# Patient Record
Sex: FEMALE | Race: WHITE | NOT HISPANIC OR LATINO | Employment: FULL TIME | URBAN - METROPOLITAN AREA
[De-identification: names, ages, dates, MRNs, and addresses within clinical notes are randomized per-mention and may not be internally consistent; named-entity substitution may affect disease eponyms.]

---

## 2017-01-05 ENCOUNTER — ROUTINE PRENATAL (OUTPATIENT)
Dept: OBSTETRICS AND GYNECOLOGY | Facility: CLINIC | Age: 27
End: 2017-01-05
Payer: COMMERCIAL

## 2017-01-05 VITALS
WEIGHT: 130.31 LBS | BODY MASS INDEX: 22.36 KG/M2 | SYSTOLIC BLOOD PRESSURE: 122 MMHG | DIASTOLIC BLOOD PRESSURE: 60 MMHG

## 2017-01-05 DIAGNOSIS — Z3A.11 11 WEEKS GESTATION OF PREGNANCY: Primary | ICD-10-CM

## 2017-01-05 LAB
BILIRUB SERPL-MCNC: NORMAL MG/DL
BLOOD URINE, POC: NORMAL
COLOR, POC UA: YELLOW
GLUCOSE UR QL STRIP: NORMAL
KETONES UR QL STRIP: NORMAL
LEUKOCYTE ESTERASE URINE, POC: NORMAL
NITRITE, POC UA: NORMAL
PH, POC UA: 5
PROTEIN, POC: NORMAL
SPECIFIC GRAVITY, POC UA: NORMAL
UROBILINOGEN, POC UA: NORMAL

## 2017-01-05 PROCEDURE — 99999 PR PBB SHADOW E&M-EST. PATIENT-LVL II: CPT | Mod: PBBFAC,,, | Performed by: SPECIALIST

## 2017-01-05 PROCEDURE — 0502F SUBSEQUENT PRENATAL CARE: CPT | Mod: S$GLB,,, | Performed by: SPECIALIST

## 2017-01-05 NOTE — PROGRESS NOTES
Pt returns for continued prenatal care  Discussed anatomy U/S at 18 weeks  Discussed quickening as well  I reviewed pt's past medical history, past and current meds, family history, allergies and reviewed problem list  RTO 4 weeks

## 2017-01-26 ENCOUNTER — TELEPHONE (OUTPATIENT)
Dept: OBSTETRICS AND GYNECOLOGY | Facility: CLINIC | Age: 27
End: 2017-01-26

## 2017-01-26 NOTE — TELEPHONE ENCOUNTER
----- Message from Elaine Antony sent at 1/26/2017 10:07 AM CST -----  Contact: self [phone]  She would like to know if she can just change her OB doctor back to Dr Krause (she delivered her last child).  Please call her to let her know.  Thank you!

## 2017-02-07 ENCOUNTER — ROUTINE PRENATAL (OUTPATIENT)
Dept: OBSTETRICS AND GYNECOLOGY | Facility: CLINIC | Age: 27
End: 2017-02-07
Payer: COMMERCIAL

## 2017-02-07 VITALS
WEIGHT: 126.75 LBS | DIASTOLIC BLOOD PRESSURE: 62 MMHG | BODY MASS INDEX: 21.76 KG/M2 | SYSTOLIC BLOOD PRESSURE: 112 MMHG

## 2017-02-07 DIAGNOSIS — Z3A.16 16 WEEKS GESTATION OF PREGNANCY: ICD-10-CM

## 2017-02-07 DIAGNOSIS — Z34.92 NORMAL PREGNANCY, SECOND TRIMESTER: Primary | ICD-10-CM

## 2017-02-07 LAB
BILIRUB SERPL-MCNC: NORMAL MG/DL
BLOOD URINE, POC: NORMAL
COLOR, POC UA: NORMAL
GLUCOSE UR QL STRIP: NORMAL
KETONES UR QL STRIP: NORMAL
LEUKOCYTE ESTERASE URINE, POC: NORMAL
NITRITE, POC UA: NORMAL
PH, POC UA: 5
PROTEIN, POC: NORMAL
SPECIFIC GRAVITY, POC UA: NORMAL
UROBILINOGEN, POC UA: NORMAL

## 2017-02-07 PROCEDURE — 99999 PR PBB SHADOW E&M-EST. PATIENT-LVL II: CPT | Mod: PBBFAC,,, | Performed by: OBSTETRICS & GYNECOLOGY

## 2017-02-07 PROCEDURE — 0502F SUBSEQUENT PRENATAL CARE: CPT | Mod: S$GLB,,, | Performed by: OBSTETRICS & GYNECOLOGY

## 2017-02-07 NOTE — PROGRESS NOTES
Maggy Koenig is a 26 y.o. female with Estimated Date of Delivery: 17   Obstetric History       T2      TAB0   SAB0   E0   M0   L2         AT 16w4d  Here today on 2017 for   Chief Complaint   Patient presents with    Routine Prenatal Visit     no problems        Current Outpatient Prescriptions   Medication Sig Dispense Refill    PNV #22-iron [id number] 28 mg iron-6 mg iron-1 mg Cmpk Take by mouth.      promethazine (PHENERGAN) 25 MG tablet Take 1 tablet (25 mg total) by mouth every 4 (four) hours. 30 tablet 1     No current facility-administered medications for this visit.      Review of patient's allergies indicates:  No Known Allergies    Past Medical History   Diagnosis Date    Abnormal Pap smear of vagina      colpo    Mild preeclampsia      At 37 weeks last pregnancy       Past Surgical History   Procedure Laterality Date    Colposcopy       section         OB History    Para Term  AB SAB TAB Ectopic Multiple Living   3 2 2       2      # Outcome Date GA Lbr Krishna/2nd Weight Sex Delivery Anes PTL Lv   3 Current            2 Term 13   2.977 kg (6 lb 9 oz) F CS-LTranv Spinal N Y      Birth Comments: System Generated. Please review and update pregnancy details.   1 Term 2011 37w0d  2.58 kg (5 lb 11 oz) F CS-LTranv Spinal  Y      Birth Comments: pre-eclampsia and delivered at 37 weeks via           Social History     Social History    Marital status:      Spouse name: N/A    Number of children: N/A    Years of education: N/A     Social History Main Topics    Smoking status: Never Smoker    Smokeless tobacco: Never Used    Alcohol use No    Drug use: No    Sexual activity: Yes     Partners: Male     Birth control/ protection: None     Other Topics Concern    Not on file     Social History Narrative       Vitals:    17 1524   BP: 112/62       Review of Symptoms:  GENERAL: Denies fatigue, and malaise.   SKIN:  Denies rash or lesions.   HEAD: Denies head injury or headache.   NODES: Denies enlarged lymph nodes.   CHEST: Denies chest pain or shortness of breath.   CARDIOVASCULAR: Denies palpitations or left sided chest pain.   ABDOMEN: No abdominal pain, constipation, diarrhea, nausea, vomiting or rectal bleeding.   URINARY: No frequency, urgency, dysuria, or hematuria  MUSCULOSKELETAL: Denies joint pain or swelling.   NEUROLOGIC: Denies seizures.    A POS    ASSESSMENT    Encounter Diagnoses   Name Primary?    16 weeks gestation of pregnancy Yes    Normal pregnancy, second trimester        PLAN  1.  RTC 4 weeks    I have reviewed the blood pressure, urine results, fetal heart rate check, fundal height and agree.

## 2017-02-07 NOTE — MR AVS SNAPSHOT
[facility] - OB/GYN  [address] [place] [address]  Phone: [phone]                  Maggy ADAN Liberty   2017 3:20 PM   Routine Prenatal    Description:  Female : 1990   Provider:  Clementine Krause MD   Department:  [facility] - OB/GYN           Reason for Visit     Routine Prenatal Visit           Diagnoses this Visit        Comments    16 weeks gestation of pregnancy    -  Primary            To Do List           Goals (5 Years of Data)     None      [facility]     [facility] -  Assistance  Registered nurses in the [facility] On Call Center provide clinical advisement, health education, appointment booking, and other advisory services.  Call for this free service at [phone].             Medications           Message regarding Medications     Verify the changes and/or additions to your medication regime listed below are the same as discussed with your clinician today.  If any of these changes or additions are incorrect, please notify your healthcare provider.             Verify that the below list of medications is an accurate representation of the medications you are currently taking.  If none reported, the list may be blank. If incorrect, please contact your healthcare provider. Carry this list with you in case of emergency.           Current Medications     PNV #22-iron [id number] 28 mg iron-6 mg iron-1 mg Cmpk Take by mouth.    promethazine (PHENERGAN) 25 MG tablet Take 1 tablet (25 mg total) by mouth every 4 (four) hours.           Clinical Reference Information           Prenatal Vitals     Enc. Date GA Prenatal Vitals Prenatal Pulse Pain Level Urine Albumin/Glucose Edema Presentation Dilation/Effacement/Station    17 16w4d 112/62 / 57.5 kg (126 lb 12.2 oz)  / 147  0 Negative / Negative       17 11w6d 122/60 / 59.1 kg (130 lb 4.7 oz)  / 154  0 Negative / Negative       16 7w6d 124/70 / 59.8 kg (131 lb 13.4 oz)    0 Negative / Negative         Your Vitals Were     BP Weight Last Period BMI       112/62 57.5 kg (126 lb 12.2 oz) 10/09/2016 21.76 kg/m2       Allergies as of 2/7/2017     No Known Allergies      Immunizations Administered on Date of Encounter - 2/7/2017     None      Orders Placed During Today's Visit      Normal Orders This Visit    POCT URINE DIPSTICK WITHOUT MICROSCOPE          2/7/2017  3:26 PM - Rosa Elena Valladares LPN      Component Results     Component    Color, UA    Spec Grav, UA    pH, UA    5    WBC, UA    neg    Nitrite, UA    neg    Protein, UA    neg    Glucose, UA    neg    Ketones, UA    neg    Urobilinogen, UA    neg    Bilirubin, UA    neg    Blood, UA    neg            Language Assistance Services     ATTENTION: Language assistance services are available, free of charge. Please call [phone].      ATENCIÓN: Si habla bryan, tiene a weiss disposición servicios gratuitos de asistencia lingüística. Llame al [phone].     CHÚ Ý: N?u b?n nói Ti?ng Vi?t, có các d?ch v? h? tr? ngôn ng? mi?n phí dành cho b?n. G?i s? [phone].         [facility] - OB/GYN complies with applicable Federal civil rights laws and does not discriminate on the basis of race, color, national origin, age, disability, or sex.

## 2017-03-09 ENCOUNTER — HOSPITAL ENCOUNTER (OUTPATIENT)
Dept: RADIOLOGY | Facility: CLINIC | Age: 27
Discharge: HOME | End: 2017-03-09
Attending: OBSTETRICS & GYNECOLOGY
Payer: COMMERCIAL

## 2017-03-09 ENCOUNTER — ROUTINE PRENATAL (OUTPATIENT)
Dept: OBSTETRICS AND GYNECOLOGY | Facility: CLINIC | Age: 27
End: 2017-03-09
Payer: COMMERCIAL

## 2017-03-09 VITALS
WEIGHT: 131.19 LBS | BODY MASS INDEX: 22.52 KG/M2 | DIASTOLIC BLOOD PRESSURE: 60 MMHG | SYSTOLIC BLOOD PRESSURE: 116 MMHG

## 2017-03-09 DIAGNOSIS — Z34.92 NORMAL PREGNANCY, SECOND TRIMESTER: ICD-10-CM

## 2017-03-09 DIAGNOSIS — Z3A.20 20 WEEKS GESTATION OF PREGNANCY: Primary | ICD-10-CM

## 2017-03-09 LAB
BILIRUB SERPL-MCNC: NEGATIVE MG/DL
BLOOD URINE, POC: NEGATIVE
COLOR, POC UA: YELLOW
GLUCOSE UR QL STRIP: NEGATIVE
KETONES UR QL STRIP: NEGATIVE
LEUKOCYTE ESTERASE URINE, POC: NEGATIVE
NITRITE, POC UA: NEGATIVE
PH, POC UA: 8
PROTEIN, POC: NEGATIVE
SPECIFIC GRAVITY, POC UA: NORMAL
UROBILINOGEN, POC UA: NEGATIVE

## 2017-03-09 PROCEDURE — 81002 URINALYSIS NONAUTO W/O SCOPE: CPT | Mod: S$GLB,,, | Performed by: OBSTETRICS & GYNECOLOGY

## 2017-03-09 PROCEDURE — 0502F SUBSEQUENT PRENATAL CARE: CPT | Mod: S$GLB,,, | Performed by: OBSTETRICS & GYNECOLOGY

## 2017-03-09 PROCEDURE — 76805 OB US >/= 14 WKS SNGL FETUS: CPT | Mod: 26,,, | Performed by: RADIOLOGY

## 2017-03-09 PROCEDURE — 99999 PR PBB SHADOW E&M-EST. PATIENT-LVL II: CPT | Mod: PBBFAC,,, | Performed by: OBSTETRICS & GYNECOLOGY

## 2017-03-09 PROCEDURE — 76805 OB US >/= 14 WKS SNGL FETUS: CPT | Mod: TC,PO

## 2017-03-09 NOTE — MR AVS SNAPSHOT
[facility] - OB/GYN  [address] [place] [address]  Phone: [phone]                  Maggy ADAN Liberty   3/9/2017 3:20 PM   Routine Prenatal    Description:  Female : 1990   Provider:  Clementine Krause MD   Department:  [facility] - OB/GYN           Reason for Visit     Routine Prenatal Visit           Diagnoses this Visit        Comments    20 weeks gestation of pregnancy    -  Primary            To Do List           Future Appointments        Provider Department Dept Phone    3/9/2017  4:00 PM [facility] US1 [facility] - Ultrasound [phone]      Goals (5 Years of Data)     None      [facility]     [facility] -  Assistance  Registered nurses in the [facility] Center provide clinical advisement, health education, appointment booking, and other advisory services.  Call for this free service at [phone].             Medications           Message regarding Medications     Verify the changes and/or additions to your medication regime listed below are the same as discussed with your clinician today.  If any of these changes or additions are incorrect, please notify your healthcare provider.             Verify that the below list of medications is an accurate representation of the medications you are currently taking.  If none reported, the list may be blank. If incorrect, please contact your healthcare provider. Carry this list with you in case of emergency.           Current Medications     PNV #22-iron [id number] 28 mg iron-6 mg iron-1 mg Cmpk Take 1 tablet by mouth once daily.     promethazine (PHENERGAN) 25 MG tablet Take 1 tablet (25 mg total) by mouth every 4 (four) hours.           Clinical Reference Information           Prenatal Vitals     Enc. Date GA Prenatal Vitals Prenatal Pulse Pain Level Urine Albumin/Glucose Edema Presentation Dilation/Effacement/Station    3/9/17 20w6d 116/60 / 59.5 kg (131 lb 2.8 oz) 21 cm / 159  0  Negative / Negative       2/7/17 16w4d 112/62 / 57.5 kg (126 lb 12.2 oz)  / 147  0 Negative / Negative       1/5/17 11w6d 122/60 / 59.1 kg (130 lb 4.7 oz)  / 154  0 Negative / Negative       12/8/16 7w6d 124/70 / 59.8 kg (131 lb 13.4 oz)   0 Negative / Negative         Your Vitals Were     BP Weight Last Period BMI       116/60 59.5 kg (131 lb 2.8 oz) 10/09/2016 22.52 kg/m2       Allergies as of 3/9/2017     No Known Allergies      Immunizations Administered on Date of Encounter - 3/9/2017     None      Orders Placed During Today's Visit      Normal Orders This Visit    POCT URINE DIPSTICK WITHOUT MICROSCOPE          3/9/2017  3:29 PM - Shavonne La LPN      Component Results     Component    Color    Yellow    Spec Grav    pH, UA    8    WBC, UA    Negative    Nitrite    Negative    Protein    Negative    Glucose, UA    Negative    Ketones, UA    Negative    Urobilinogen    Negative    Bilirubin    Negative    Blood, UA    Negative            Language Assistance Services     ATTENTION: Language assistance services are available, free of charge. Please call [phone].      ATENCIÓN: Si habla español, tiene a weiss disposición servicios gratuitos de asistencia lingüística. Llame al [phone].     NAKIA Ý: N?u b?n nói Ti?ng Vi?t, có các d?ch v? h? tr? ngôn ng? mi?n phí dành cho b?n. G?i s? [phone].         [facility] - OB/GYN complies with applicable Federal civil rights laws and does not discriminate on the basis of race, color, national origin, age, disability, or sex.

## 2017-03-09 NOTE — PROGRESS NOTES
Maggy Koenig is a 26 y.o. female with Estimated Date of Delivery: 17   Obstetric History       T2      TAB0   SAB0   E0   M0   L2         AT 20w6d  Here today on 3/9/2017 for   Chief Complaint   Patient presents with    Routine Prenatal Visit     20 weeks gestation       Current Outpatient Prescriptions   Medication Sig Dispense Refill    PNV #22-iron [id number] 28 mg iron-6 mg iron-1 mg Cmpk Take 1 tablet by mouth once daily.       promethazine (PHENERGAN) 25 MG tablet Take 1 tablet (25 mg total) by mouth every 4 (four) hours. 30 tablet 1     No current facility-administered medications for this visit.      Review of patient's allergies indicates:  No Known Allergies    Past Medical History:   Diagnosis Date    Abnormal Pap smear of vagina     colpo    Mild preeclampsia     At 37 weeks last pregnancy       Past Surgical History:   Procedure Laterality Date     SECTION      COLPOSCOPY         OB History    Para Term  AB SAB TAB Ectopic Multiple Living   3 2 2       2      # Outcome Date GA Lbr Krishna/2nd Weight Sex Delivery Anes PTL Lv   3 Current            2 Term 13   2.977 kg (6 lb 9 oz) F CS-LTranv Spinal N Y      Birth Comments: System Generated. Please review and update pregnancy details.   1 Term 2011 37w0d  2.58 kg (5 lb 11 oz) F CS-LTranv Spinal  Y      Birth Comments: pre-eclampsia and delivered at 37 weeks via           Social History     Social History    Marital status:      Spouse name: N/A    Number of children: N/A    Years of education: N/A     Social History Main Topics    Smoking status: Never Smoker    Smokeless tobacco: Never Used    Alcohol use No    Drug use: No    Sexual activity: Yes     Partners: Male     Birth control/ protection: None     Other Topics Concern    None     Social History Narrative       Vitals:    17 1530   BP: 116/60       Review of Symptoms:  GENERAL: Denies fatigue, and  malaise.   SKIN: Denies rash or lesions.   HEAD: Denies head injury or headache.   NODES: Denies enlarged lymph nodes.   CHEST: Denies chest pain or shortness of breath.   CARDIOVASCULAR: Denies palpitations or left sided chest pain.   ABDOMEN: No abdominal pain, constipation, diarrhea, nausea, vomiting or rectal bleeding.   URINARY: No frequency, urgency, dysuria, or hematuria  MUSCULOSKELETAL: Denies joint pain or swelling.   NEUROLOGIC: Denies seizures.    A POS    ASSESSMENT    Encounter Diagnoses   Name Primary?    20 weeks gestation of pregnancy Yes    Normal pregnancy, second trimester        PLAN  1.  RTC 4 with ob glucose  I have reviewed the blood pressure, urine results, fetal heart rate check, fundal height and agree.

## 2017-04-01 ENCOUNTER — LAB VISIT (OUTPATIENT)
Dept: LAB | Facility: HOSPITAL | Age: 27
End: 2017-04-01
Attending: OBSTETRICS & GYNECOLOGY
Payer: COMMERCIAL

## 2017-04-01 DIAGNOSIS — Z3A.20 20 WEEKS GESTATION OF PREGNANCY: ICD-10-CM

## 2017-04-01 LAB — GLUCOSE SERPL-MCNC: 118 MG/DL

## 2017-04-01 PROCEDURE — 36415 COLL VENOUS BLD VENIPUNCTURE: CPT | Mod: PO

## 2017-04-01 PROCEDURE — 82950 GLUCOSE TEST: CPT

## 2017-04-18 ENCOUNTER — ROUTINE PRENATAL (OUTPATIENT)
Dept: OBSTETRICS AND GYNECOLOGY | Facility: CLINIC | Age: 27
End: 2017-04-18
Payer: COMMERCIAL

## 2017-04-18 VITALS
WEIGHT: 134.69 LBS | SYSTOLIC BLOOD PRESSURE: 116 MMHG | DIASTOLIC BLOOD PRESSURE: 70 MMHG | BODY MASS INDEX: 23.12 KG/M2

## 2017-04-18 DIAGNOSIS — Z34.92 NORMAL PREGNANCY, SECOND TRIMESTER: ICD-10-CM

## 2017-04-18 DIAGNOSIS — Z3A.26 26 WEEKS GESTATION OF PREGNANCY: Primary | ICD-10-CM

## 2017-04-18 LAB
BILIRUB SERPL-MCNC: NEGATIVE MG/DL
BLOOD URINE, POC: NEGATIVE
COLOR, POC UA: NORMAL
GLUCOSE UR QL STRIP: NEGATIVE
KETONES UR QL STRIP: NEGATIVE
LEUKOCYTE ESTERASE URINE, POC: NEGATIVE
NITRITE, POC UA: NEGATIVE
PH, POC UA: 8
PROTEIN, POC: NEGATIVE
SPECIFIC GRAVITY, POC UA: NORMAL
UROBILINOGEN, POC UA: NEGATIVE

## 2017-04-18 PROCEDURE — 99999 PR PBB SHADOW E&M-EST. PATIENT-LVL II: CPT | Mod: PBBFAC,,, | Performed by: OBSTETRICS & GYNECOLOGY

## 2017-04-18 PROCEDURE — 0502F SUBSEQUENT PRENATAL CARE: CPT | Mod: S$GLB,,, | Performed by: OBSTETRICS & GYNECOLOGY

## 2017-04-18 NOTE — MR AVS SNAPSHOT
[facility] - OB/GYN  [address] [place] [address]  Phone: [phone]                  aMggy ADAN Liberty   2017 9:40 AM   Routine Prenatal    Description:  Female : 1990   Provider:  Clementine Krause MD   Department:  [facility] - OB/GYN           Reason for Visit     Routine Prenatal Visit                To Do List           Goals (5 Years of Data)     None      [facility]     [facility] -  Assistance  Unless otherwise directed by your provider, please contact [facility] On-Call, our nurse care line that is available for  assistance.     Registered nurses in the [facility] On Call Center provide: appointment scheduling, clinical advisement, health education, and other advisory services.  Call: [phone] (toll free)               Medications           Message regarding Medications     Verify the changes and/or additions to your medication regime listed below are the same as discussed with your clinician today.  If any of these changes or additions are incorrect, please notify your healthcare provider.             Verify that the below list of medications is an accurate representation of the medications you are currently taking.  If none reported, the list may be blank. If incorrect, please contact your healthcare provider. Carry this list with you in case of emergency.           Current Medications     PNV #22-iron [id number]-dha 28 mg iron-6 mg iron-1 mg Cmpk Take 1 tablet by mouth once daily.     promethazine (PHENERGAN) 25 MG tablet Take 1 tablet (25 mg total) by mouth every 4 (four) hours.           Clinical Reference Information           Prenatal Vitals     Enc. Date GA Prenatal Vitals Prenatal Pulse Pain Level Urine Albumin/Glucose Edema Presentation Dilation/Effacement/Station    17 26w4d 116/70 / 61.1 kg (134 lb 11.2 oz) 26 cm / 135 / Present  0 Negative / Negative       3/9/17 20w6d 116/60 / 59.5 kg (131 lb 2.8 oz) 21 cm  / 159  0 Negative / Negative       2/7/17 16w4d 112/62 / 57.5 kg (126 lb 12.2 oz)  / 147  0 Negative / Negative       1/5/17 11w6d 122/60 / 59.1 kg (130 lb 4.7 oz)  / 154  0 Negative / Negative       12/8/16 7w6d 124/70 / 59.8 kg (131 lb 13.4 oz)   0 Negative / Negative         Your Vitals Were     BP Weight Last Period BMI       116/70 61.1 kg (134 lb 11.2 oz) 10/09/2016 23.12 kg/m2       Allergies as of 4/18/2017     No Known Allergies      Immunizations Administered on Date of Encounter - 4/18/2017     None      Language Assistance Services     ATTENTION: Language assistance services are available, free of charge. Please call [phone].      ATENCIÓN: Si habla bryan, tiene a weiss disposición servicios gratuitos de asistencia lingüística. Llame al [phone].     CHÚ Ý: N?u b?n nói Ti?ng Vi?t, có các d?ch v? h? tr? ngôn ng? mi?n phí dành cho b?n. G?i s? [phone].         [facility] - OB/GYN complies with applicable Federal civil rights laws and does not discriminate on the basis of race, color, national origin, age, disability, or sex.

## 2017-04-18 NOTE — PROGRESS NOTES
Maggy Koenig is a 26 y.o. female with Estimated Date of Delivery: 17   Obstetric History       T2      TAB0   SAB0   E0   M0   L2         AT 26w4d  Here today on 2017 for   Chief Complaint   Patient presents with    Routine Prenatal Visit       Current Outpatient Prescriptions   Medication Sig Dispense Refill    PNV #22-iron [id number]-[id number] 28 mg iron-6 mg iron-1 mg Cmpk Take 1 tablet by mouth once daily.       promethazine (PHENERGAN) 25 MG tablet Take 1 tablet (25 mg total) by mouth every 4 (four) hours. 30 tablet 1     No current facility-administered medications for this visit.      Review of patient's allergies indicates:  No Known Allergies    Past Medical History:   Diagnosis Date    Abnormal Pap smear of vagina     colpo    Mild preeclampsia     At 37 weeks last pregnancy       Past Surgical History:   Procedure Laterality Date     SECTION      COLPOSCOPY         OB History    Para Term  AB SAB TAB Ectopic Multiple Living   3 2 2       2      # Outcome Date GA Lbr Krishna/2nd Weight Sex Delivery Anes PTL Lv   3 Current            2 Term 13   2.977 kg (6 lb 9 oz) F CS-LTranv Spinal N Y      Birth Comments: System Generated. Please review and update pregnancy details.   1 Term 11 37w0d  2.58 kg (5 lb 11 oz) F CS-LTranv Spinal  Y      Birth Comments: pre-eclampsia and delivered at 37 weeks via           Social History     Social History    Marital status:      Spouse name: N/A    Number of children: N/A    Years of education: N/A     Social History Main Topics    Smoking status: Never Smoker    Smokeless tobacco: Never Used    Alcohol use No    Drug use: No    Sexual activity: Yes     Partners: Male     Birth control/ protection: None     Other Topics Concern    Not on file     Social History Narrative       Vitals:    17 1003   BP: 116/70       Review of Symptoms:  GENERAL: Denies fatigue, and malaise.   SKIN:  Denies rash or lesions.   HEAD: Denies head injury or headache.   NODES: Denies enlarged lymph nodes.   CHEST: Denies chest pain or shortness of breath.   CARDIOVASCULAR: Denies palpitations or left sided chest pain.   ABDOMEN: No abdominal pain, constipation, diarrhea, nausea, vomiting or rectal bleeding.   URINARY: No frequency, urgency, dysuria, or hematuria  MUSCULOSKELETAL: Denies joint pain or swelling.   NEUROLOGIC: Denies seizures.    A POS    ASSESSMENT    Encounter Diagnoses   Name Primary?    26 weeks gestation of pregnancy Yes    Normal pregnancy, second trimester        PLAN  1.  RTC 3 weeks     I have reviewed the blood pressure, urine results, fetal heart rate check, fundal height and agree.

## 2017-05-09 ENCOUNTER — ROUTINE PRENATAL (OUTPATIENT)
Dept: OBSTETRICS AND GYNECOLOGY | Facility: CLINIC | Age: 27
End: 2017-05-09
Payer: COMMERCIAL

## 2017-05-09 VITALS — DIASTOLIC BLOOD PRESSURE: 70 MMHG | SYSTOLIC BLOOD PRESSURE: 120 MMHG | WEIGHT: 138.69 LBS | BODY MASS INDEX: 23.8 KG/M2

## 2017-05-09 DIAGNOSIS — Z34.93 NORMAL PREGNANCY, THIRD TRIMESTER: ICD-10-CM

## 2017-05-09 DIAGNOSIS — Z3A.29 29 WEEKS GESTATION OF PREGNANCY: Primary | ICD-10-CM

## 2017-05-09 LAB
BILIRUB SERPL-MCNC: NORMAL MG/DL
BLOOD URINE, POC: NORMAL
COLOR, POC UA: NORMAL
GLUCOSE UR QL STRIP: NORMAL
KETONES UR QL STRIP: NORMAL
LEUKOCYTE ESTERASE URINE, POC: NORMAL
NITRITE, POC UA: NORMAL
PH, POC UA: NORMAL
PROTEIN, POC: NORMAL
SPECIFIC GRAVITY, POC UA: NORMAL
UROBILINOGEN, POC UA: NORMAL

## 2017-05-09 PROCEDURE — 81002 URINALYSIS NONAUTO W/O SCOPE: CPT | Mod: S$GLB,,, | Performed by: OBSTETRICS & GYNECOLOGY

## 2017-05-09 PROCEDURE — 99999 PR PBB SHADOW E&M-EST. PATIENT-LVL II: CPT | Mod: PBBFAC,,, | Performed by: OBSTETRICS & GYNECOLOGY

## 2017-05-09 PROCEDURE — 0502F SUBSEQUENT PRENATAL CARE: CPT | Mod: S$GLB,,, | Performed by: OBSTETRICS & GYNECOLOGY

## 2017-05-09 NOTE — PROGRESS NOTES
Maggy Koenig is a 26 y.o. female with Estimated Date of Delivery: 17   Obstetric History       T2      TAB0   SAB0   E0   M0   L2         AT 29w4d  Here today on 2017 for   Chief Complaint   Patient presents with    Routine Prenatal Visit     No problems        Current Outpatient Prescriptions   Medication Sig Dispense Refill    PNV #22-iron [id number]-dha 28 mg iron-6 mg iron-1 mg Cmpk Take 1 tablet by mouth once daily.       promethazine (PHENERGAN) 25 MG tablet Take 1 tablet (25 mg total) by mouth every 4 (four) hours. 30 tablet 1     No current facility-administered medications for this visit.      Review of patient's allergies indicates:  No Known Allergies    Past Medical History:   Diagnosis Date    Abnormal Pap smear of vagina     colpo    Mild preeclampsia     At 37 weeks last pregnancy       Past Surgical History:   Procedure Laterality Date     SECTION      COLPOSCOPY         OB History    Para Term  AB SAB TAB Ectopic Multiple Living   3 2 2       2      # Outcome Date GA Lbr Krishna/2nd Weight Sex Delivery Anes PTL Lv   3 Current            2 Term 13 39w0d  2.977 kg (6 lb 9 oz) F CS-LTranv Spinal N Y      Birth Comments: System Generated. Please review and update pregnancy details.   1 Term 11 37w0d  2.58 kg (5 lb 11 oz) F CS-LTranv Spinal  Y      Birth Comments: pre-eclampsia and delivered at 37 weeks via           Social History     Social History    Marital status:      Spouse name: N/A    Number of children: N/A    Years of education: N/A     Social History Main Topics    Smoking status: Never Smoker    Smokeless tobacco: Never Used    Alcohol use No    Drug use: No    Sexual activity: Yes     Partners: Male     Birth control/ protection: None     Other Topics Concern    None     Social History Narrative       Vitals:    17 1544   BP: 120/70       Review of Symptoms:  GENERAL: Denies fatigue, and  malaise.   SKIN: Denies rash or lesions.   HEAD: Denies head injury or headache.   NODES: Denies enlarged lymph nodes.   CHEST: Denies chest pain or shortness of breath.   CARDIOVASCULAR: Denies palpitations or left sided chest pain.   ABDOMEN: No abdominal pain, constipation, diarrhea, nausea, vomiting or rectal bleeding.   URINARY: No frequency, urgency, dysuria, or hematuria  MUSCULOSKELETAL: Denies joint pain or swelling.   NEUROLOGIC: Denies seizures.    A POS    ASSESSMENT    Encounter Diagnoses   Name Primary?    29 weeks gestation of pregnancy Yes    Normal pregnancy, third trimester        PLAN  1.  RTC 3 weeks     I have reviewed the blood pressure, urine results, fetal heart rate check, fundal height and agree.

## 2017-05-09 NOTE — MR AVS SNAPSHOT
[facility]  [address], [address]  [facility] [address]  Phone: [phone]  Fax: [phone]                  Maggy Banuelosmickey   2017 4:00 PM   Routine Prenatal    Description:  Female : 1990   Provider:  Clementine Krause MD   Department:  [facility]           Reason for Visit     Routine Prenatal Visit                To Do List           Future Appointments        Provider Department Dept Phone    2017 4:00 PM Clementine Krause MD [facility] [phone]      Goals (5 Years of Data)     None      [facility]     [facility] Care Line -  Assistance  Unless otherwise directed by your provider, please contact [facility], our nurse care line that is available for  assistance.     Registered nurses in the [facility] provide: appointment scheduling, clinical advisement, health education, and other advisory services.  Call: [phone] (toll free)               Medications           Message regarding Medications     Verify the changes and/or additions to your medication regime listed below are the same as discussed with your clinician today.  If any of these changes or additions are incorrect, please notify your healthcare provider.             Verify that the below list of medications is an accurate representation of the medications you are currently taking.  If none reported, the list may be blank. If incorrect, please contact your healthcare provider. Carry this list with you in case of emergency.           Current Medications     PNV #22-iron [id number]-[id number]-dha 28 mg iron-6 mg iron-1 mg Cmpk Take 1 tablet by mouth once daily.     promethazine (PHENERGAN) 25 MG tablet Take 1 tablet (25 mg total) by mouth every 4 (four) hours.           Clinical Reference Information           Prenatal Vitals     Enc. Date GA Prenatal Vitals Prenatal Pulse Pain Level Urine Albumin/Glucose  Edema Presentation Dilation/Effacement/Station    5/9/17 29w4d 120/70 / 62.9 kg (138 lb 10.7 oz) 30 cm / 147 / Present  0        4/18/17 26w4d 116/70 / 61.1 kg (134 lb 11.2 oz) 26 cm / 135 / Present  0 Negative / Negative       3/9/17 20w6d 116/60 / 59.5 kg (131 lb 2.8 oz) 21 cm / 159  0 Negative / Negative       2/7/17 16w4d 112/62 / 57.5 kg (126 lb 12.2 oz)  / 147  0 Negative / Negative       1/5/17 11w6d 122/60 / 59.1 kg (130 lb 4.7 oz)  / 154  0 Negative / Negative       12/8/16 7w6d 124/70 / 59.8 kg (131 lb 13.4 oz)   0 Negative / Negative         Your Vitals Were     BP Weight Last Period BMI       120/70 62.9 kg (138 lb 10.7 oz) 10/09/2016 23.8 kg/m2       Allergies as of 5/9/2017     No Known Allergies      Immunizations Administered on Date of Encounter - 5/9/2017     None      Language Assistance Services     ATTENTION: Language assistance services are available, free of charge. Please call [phone].      ATENCIÓN: Si habla bryan, tiene a weiss disposición servicios gratuitos de asistencia lingüística. Llame al [phone].     CHÚ Ý: N?u b?n nói Ti?ng Vi?t, có các d?ch v? h? tr? ngôn ng? mi?n phí dành cho b?n. G?i s? [phone].         [facility] complies with applicable Federal civil rights laws and does not discriminate on the basis of race, color, national origin, age, disability, or sex.

## 2017-05-30 ENCOUNTER — PATIENT MESSAGE (OUTPATIENT)
Dept: OBSTETRICS AND GYNECOLOGY | Facility: CLINIC | Age: 27
End: 2017-05-30

## 2017-06-05 ENCOUNTER — ROUTINE PRENATAL (OUTPATIENT)
Dept: OBSTETRICS AND GYNECOLOGY | Facility: CLINIC | Age: 27
End: 2017-06-05
Payer: COMMERCIAL

## 2017-06-05 VITALS
WEIGHT: 143.94 LBS | BODY MASS INDEX: 24.71 KG/M2 | DIASTOLIC BLOOD PRESSURE: 80 MMHG | SYSTOLIC BLOOD PRESSURE: 116 MMHG

## 2017-06-05 DIAGNOSIS — Z3A.34 34 WEEKS GESTATION OF PREGNANCY: Primary | ICD-10-CM

## 2017-06-05 DIAGNOSIS — Z34.93 NORMAL PREGNANCY, THIRD TRIMESTER: ICD-10-CM

## 2017-06-05 PROCEDURE — 0502F SUBSEQUENT PRENATAL CARE: CPT | Mod: S$GLB,,, | Performed by: OBSTETRICS & GYNECOLOGY

## 2017-06-05 PROCEDURE — 99999 PR PBB SHADOW E&M-EST. PATIENT-LVL III: CPT | Mod: PBBFAC,,, | Performed by: OBSTETRICS & GYNECOLOGY

## 2017-06-05 NOTE — PROGRESS NOTES
Maggy Koenig is a 26 y.o. female with Estimated Date of Delivery: 17   Obstetric History       T2      L2     SAB0   TAB0   Ectopic0   Multiple0   Live Births2         AT 34w1d  Here today on 2017 for   Chief Complaint   Patient presents with    Routine Prenatal Visit       Current Outpatient Prescriptions   Medication Sig Dispense Refill    PNV #22-iron [id number] 28 mg iron-6 mg iron-1 mg Cmpk Take 1 tablet by mouth once daily.       promethazine (PHENERGAN) 25 MG tablet Take 1 tablet (25 mg total) by mouth every 4 (four) hours. 30 tablet 1     No current facility-administered medications for this visit.      Review of patient's allergies indicates:  No Known Allergies    Past Medical History:   Diagnosis Date    Abnormal Pap smear of vagina     colpo    Mild preeclampsia     At 37 weeks last pregnancy       Past Surgical History:   Procedure Laterality Date     SECTION      COLPOSCOPY         OB History    Para Term  AB Living   3 2 2   2   SAB TAB Ectopic Multiple Live Births       2      # Outcome Date GA Lbr Krishna/2nd Weight Sex Delivery Anes PTL Lv   3 Current            2 Term 13 39w0d  2.977 kg (6 lb 9 oz) F CS-LTranv Spinal N KADI      Birth Comments: System Generated. Please review and update pregnancy details.   1 Term 11 37w0d  2.58 kg (5 lb 11 oz) F CS-LTranv Spinal  KADI      Birth Comments: pre-eclampsia and delivered at 37 weeks via           Social History     Social History    Marital status:      Spouse name: N/A    Number of children: N/A    Years of education: N/A     Social History Main Topics    Smoking status: Never Smoker    Smokeless tobacco: Never Used    Alcohol use No    Drug use: No    Sexual activity: Yes     Partners: Male     Birth control/ protection: None     Other Topics Concern    Not on file     Social History Narrative    No narrative on file       Vitals:    17 1055    BP: 116/80       Review of Symptoms:  GENERAL: Denies fatigue, and malaise.   SKIN: Denies rash or lesions.   HEAD: Denies head injury or headache.   NODES: Denies enlarged lymph nodes.   CHEST: Denies chest pain or shortness of breath.   CARDIOVASCULAR: Denies palpitations or left sided chest pain.   ABDOMEN: No abdominal pain, constipation, diarrhea, nausea, vomiting or rectal bleeding.   URINARY: No frequency, urgency, dysuria, or hematuria  MUSCULOSKELETAL: Denies joint pain or swelling.   NEUROLOGIC: Denies seizures.    A POS    ASSESSMENT    No diagnosis found.    PLAN  1.  RTC 2 weeks with ultrasound    I have reviewed the blood pressure, urine results, fetal heart rate check, fundal height and agree.

## 2017-06-15 ENCOUNTER — PATIENT MESSAGE (OUTPATIENT)
Dept: OBSTETRICS AND GYNECOLOGY | Facility: CLINIC | Age: 27
End: 2017-06-15

## 2017-06-19 ENCOUNTER — ROUTINE PRENATAL (OUTPATIENT)
Dept: OBSTETRICS AND GYNECOLOGY | Facility: CLINIC | Age: 27
End: 2017-06-19
Payer: COMMERCIAL

## 2017-06-19 ENCOUNTER — HOSPITAL ENCOUNTER (OUTPATIENT)
Dept: RADIOLOGY | Facility: HOSPITAL | Age: 27
Discharge: HOME | End: 2017-06-19
Attending: OBSTETRICS & GYNECOLOGY
Payer: COMMERCIAL

## 2017-06-19 VITALS — BODY MASS INDEX: 25.32 KG/M2 | SYSTOLIC BLOOD PRESSURE: 122 MMHG | DIASTOLIC BLOOD PRESSURE: 80 MMHG | WEIGHT: 147.5 LBS

## 2017-06-19 DIAGNOSIS — Z3A.36 36 WEEKS GESTATION OF PREGNANCY: Primary | ICD-10-CM

## 2017-06-19 DIAGNOSIS — Z3A.34 34 WEEKS GESTATION OF PREGNANCY: ICD-10-CM

## 2017-06-19 LAB
BILIRUB SERPL-MCNC: NORMAL MG/DL
BLOOD URINE, POC: NORMAL
COLOR, POC UA: YELLOW
GLUCOSE UR QL STRIP: NORMAL
KETONES UR QL STRIP: NORMAL
LEUKOCYTE ESTERASE URINE, POC: NORMAL
NITRITE, POC UA: NORMAL
PH, POC UA: 6
PROTEIN, POC: NORMAL
SPECIFIC GRAVITY, POC UA: NORMAL
UROBILINOGEN, POC UA: NORMAL

## 2017-06-19 PROCEDURE — 76816 OB US FOLLOW-UP PER FETUS: CPT | Mod: 26,,, | Performed by: RADIOLOGY

## 2017-06-19 PROCEDURE — 0502F SUBSEQUENT PRENATAL CARE: CPT | Mod: S$GLB,,, | Performed by: OBSTETRICS & GYNECOLOGY

## 2017-06-19 PROCEDURE — 87081 CULTURE SCREEN ONLY: CPT

## 2017-06-19 PROCEDURE — 76816 OB US FOLLOW-UP PER FETUS: CPT | Mod: TC

## 2017-06-19 PROCEDURE — 99999 PR PBB SHADOW E&M-EST. PATIENT-LVL III: CPT | Mod: PBBFAC,,, | Performed by: OBSTETRICS & GYNECOLOGY

## 2017-06-19 NOTE — PROGRESS NOTES
Maggy Koenig is a 26 y.o. female with Estimated Date of Delivery: 17   Obstetric History       T2      L2     SAB0   TAB0   Ectopic0   Multiple0   Live Births2         AT 36w1d  Here today on 2017 for   Chief Complaint   Patient presents with    Routine Prenatal Visit       Current Outpatient Prescriptions   Medication Sig Dispense Refill    PNV #22-iron [id number] 28 mg iron-6 mg iron-1 mg Cmpk Take 1 tablet by mouth once daily.       ranitidine (ZANTAC) 300 MG tablet Take 1 tablet (300 mg total) by mouth nightly. 30 tablet 1    promethazine (PHENERGAN) 25 MG tablet Take 1 tablet (25 mg total) by mouth every 4 (four) hours. 30 tablet 1     No current facility-administered medications for this visit.      Review of patient's allergies indicates:  No Known Allergies    Past Medical History:   Diagnosis Date    Abnormal Pap smear of vagina     colpo    Mild preeclampsia     At 37 weeks last pregnancy       Past Surgical History:   Procedure Laterality Date     SECTION      COLPOSCOPY         OB History    Para Term  AB Living   3 2 2   2   SAB TAB Ectopic Multiple Live Births       2      # Outcome Date GA Lbr Krishna/2nd Weight Sex Delivery Anes PTL Lv   3 Current            2 Term 13 39w0d  2.977 kg (6 lb 9 oz) F CS-LTranv Spinal N KADI      Birth Comments: System Generated. Please review and update pregnancy details.   1 Term 11 37w0d  2.58 kg (5 lb 11 oz) F CS-LTranv Spinal  KADI      Birth Comments: pre-eclampsia and delivered at 37 weeks via           Social History     Social History    Marital status:      Spouse name: N/A    Number of children: N/A    Years of education: N/A     Social History Main Topics    Smoking status: Never Smoker    Smokeless tobacco: Never Used    Alcohol use No    Drug use: No    Sexual activity: Yes     Partners: Male     Birth control/ protection: None     Other Topics Concern     None     Social History Narrative    None       Vitals:    06/19/17 1046   BP: 122/80       Review of Symptoms:  GENERAL: Denies fatigue, and malaise.   SKIN: Denies rash or lesions.   HEAD: Denies head injury or headache.   NODES: Denies enlarged lymph nodes.   CHEST: Denies chest pain or shortness of breath.   CARDIOVASCULAR: Denies palpitations or left sided chest pain.   ABDOMEN: No abdominal pain, constipation, diarrhea, nausea, vomiting or rectal bleeding.   URINARY: No frequency, urgency, dysuria, or hematuria  MUSCULOSKELETAL: Denies joint pain or swelling.   NEUROLOGIC: Denies seizures.    A POS    ASSESSMENT    Encounter Diagnoses   Name Primary?    36 weeks gestation of pregnancy Yes       PLAN  1.  RTC 1 weeks     I have reviewed the blood pressure, urine results, fetal heart rate check, fundal height and agree.

## 2017-06-21 LAB — BACTERIA SPEC AEROBE CULT: NORMAL

## 2017-06-26 ENCOUNTER — TELEPHONE (OUTPATIENT)
Dept: OBSTETRICS AND GYNECOLOGY | Facility: CLINIC | Age: 27
End: 2017-06-26

## 2017-06-26 ENCOUNTER — ROUTINE PRENATAL (OUTPATIENT)
Dept: OBSTETRICS AND GYNECOLOGY | Facility: CLINIC | Age: 27
End: 2017-06-26
Payer: COMMERCIAL

## 2017-06-26 VITALS
WEIGHT: 147.69 LBS | HEART RATE: 105 BPM | BODY MASS INDEX: 25.35 KG/M2 | SYSTOLIC BLOOD PRESSURE: 140 MMHG | DIASTOLIC BLOOD PRESSURE: 58 MMHG

## 2017-06-26 DIAGNOSIS — Z3A.37 37 WEEKS GESTATION OF PREGNANCY: Primary | ICD-10-CM

## 2017-06-26 DIAGNOSIS — R50.9 FEVER AND CHILLS: ICD-10-CM

## 2017-06-26 LAB
BILIRUB SERPL-MCNC: NEGATIVE MG/DL
BLOOD URINE, POC: NEGATIVE
COLOR, POC UA: NORMAL
GLUCOSE UR QL STRIP: NEGATIVE
KETONES UR QL STRIP: NEGATIVE
LEUKOCYTE ESTERASE URINE, POC: NEGATIVE
NITRITE, POC UA: NEGATIVE
PH, POC UA: 6
PROTEIN, POC: NEGATIVE
SPECIFIC GRAVITY, POC UA: NORMAL
UROBILINOGEN, POC UA: NEGATIVE

## 2017-06-26 PROCEDURE — 0502F SUBSEQUENT PRENATAL CARE: CPT | Mod: S$GLB,,, | Performed by: OBSTETRICS & GYNECOLOGY

## 2017-06-26 PROCEDURE — 99999 PR PBB SHADOW E&M-EST. PATIENT-LVL II: CPT | Mod: PBBFAC,,, | Performed by: OBSTETRICS & GYNECOLOGY

## 2017-06-26 RX ORDER — OSELTAMIVIR PHOSPHATE 75 MG/1
75 CAPSULE ORAL 2 TIMES DAILY
Qty: 10 CAPSULE | Refills: 0 | Status: SHIPPED | OUTPATIENT
Start: 2017-06-26 | End: 2017-07-01

## 2017-06-26 NOTE — PROGRESS NOTES
Maggy Koenig is a 26 y.o. female with Estimated Date of Delivery: 17   Obstetric History       T2      L2     SAB0   TAB0   Ectopic0   Multiple0   Live Births2         AT 37w1d  Here today on 2017 for   Chief Complaint   Patient presents with    Back Pain     flank pain more on the right,     Fever     low grade 100.6 since Saturday, tylenol brings it down, temp currently 97.2       Current Outpatient Prescriptions   Medication Sig Dispense Refill    oseltamivir (TAMIFLU) 75 MG capsule Take 1 capsule (75 mg total) by mouth 2 (two) times daily. 10 capsule 0    PNV #22-iron [id number]-dha 28 mg iron-6 mg iron-1 mg Cmpk Take 1 tablet by mouth once daily.       promethazine (PHENERGAN) 25 MG tablet Take 1 tablet (25 mg total) by mouth every 4 (four) hours. 30 tablet 1    ranitidine (ZANTAC) 300 MG tablet Take 1 tablet (300 mg total) by mouth nightly. 30 tablet 1     No current facility-administered medications for this visit.      Review of patient's allergies indicates:  No Known Allergies    Past Medical History:   Diagnosis Date    Abnormal Pap smear of vagina     colpo    Mild preeclampsia     At 37 weeks last pregnancy       Past Surgical History:   Procedure Laterality Date     SECTION      COLPOSCOPY         OB History    Para Term  AB Living   3 2 2     2   SAB TAB Ectopic Multiple Live Births           2      # Outcome Date GA Lbr Krishna/2nd Weight Sex Delivery Anes PTL Lv   3 Current            2 Term 13 39w0d  2.977 kg (6 lb 9 oz) F CS-LTranv Spinal N KADI      Birth Comments: System Generated. Please review and update pregnancy details.   1 Term 11 37w0d  2.58 kg (5 lb 11 oz) F CS-LTranv Spinal  KADI      Birth Comments: pre-eclampsia and delivered at 37 weeks via           Social History     Social History    Marital status:      Spouse name: N/A    Number of children: N/A    Years of education: N/A     Social  History Main Topics    Smoking status: Never Smoker    Smokeless tobacco: Never Used    Alcohol use No    Drug use: No    Sexual activity: Yes     Partners: Male     Birth control/ protection: None     Other Topics Concern    Not on file     Social History Narrative    No narrative on file       Vitals:    06/26/17 1048   BP: (!) 140/58   Pulse: 105       Review of Symptoms:  GENERAL: +++ fatigue, and malaise. Fever 100.8  SKIN: Denies rash or lesions.   HEAD: Denies head injury or headache.   NODES: Denies enlarged lymph nodes.   CHEST: Denies chest pain or shortness of breath.   CARDIOVASCULAR: Denies palpitations or left sided chest pain.   ABDOMEN: No abdominal pain, constipation, diarrhea, nausea, vomiting or rectal bleeding.   URINARY: No frequency, urgency, dysuria, or hematuria  MUSCULOSKELETAL: Denies joint pain or swelling.   NEUROLOGIC: Denies seizures.    A POS    ASSESSMENT    Encounter Diagnoses   Name Primary?    37 weeks gestation of pregnancy Yes    Fever and chills        PLAN  1.  RTC 1 weeks     I have reviewed the blood pressure, urine results, fetal heart rate check, fundal height and agree.

## 2017-06-26 NOTE — TELEPHONE ENCOUNTER
----- Message from Zoila Arellnao sent at 6/26/2017  7:12 AM CDT -----  Contact: self  Patient [phone] is calling/she is 37 weeks pregnant/since Saturday night 06 24 17 patient has had some back pain/fever up to 100.6 orally and chills/she has an appt tomorrow 06 27 17 at 2:40am but is asking to be seen today/she states she had no other symptoms/no congestion/no cough/no other pains/please call patient this morning

## 2017-07-03 ENCOUNTER — LAB VISIT (OUTPATIENT)
Dept: LAB | Facility: HOSPITAL | Age: 27
End: 2017-07-03
Attending: OBSTETRICS & GYNECOLOGY
Payer: COMMERCIAL

## 2017-07-03 ENCOUNTER — ROUTINE PRENATAL (OUTPATIENT)
Dept: OBSTETRICS AND GYNECOLOGY | Facility: CLINIC | Age: 27
End: 2017-07-03
Payer: COMMERCIAL

## 2017-07-03 VITALS
BODY MASS INDEX: 25.43 KG/M2 | DIASTOLIC BLOOD PRESSURE: 62 MMHG | WEIGHT: 148.13 LBS | SYSTOLIC BLOOD PRESSURE: 120 MMHG | HEART RATE: 110 BPM

## 2017-07-03 DIAGNOSIS — R00.0 TACHYCARDIA: ICD-10-CM

## 2017-07-03 DIAGNOSIS — Z34.93 NORMAL PREGNANCY, THIRD TRIMESTER: Primary | ICD-10-CM

## 2017-07-03 LAB
ALBUMIN SERPL BCP-MCNC: 2.3 G/DL
ALP SERPL-CCNC: 148 U/L
ALT SERPL W/O P-5'-P-CCNC: 7 U/L
ANION GAP SERPL CALC-SCNC: 12 MMOL/L
AST SERPL-CCNC: 13 U/L
BASOPHILS # BLD AUTO: 0.01 K/UL
BASOPHILS NFR BLD: 0.1 %
BILIRUB SERPL-MCNC: 0.2 MG/DL
BUN SERPL-MCNC: 8 MG/DL
CALCIUM SERPL-MCNC: 9.3 MG/DL
CHLORIDE SERPL-SCNC: 105 MMOL/L
CO2 SERPL-SCNC: 21 MMOL/L
CREAT SERPL-MCNC: 0.8 MG/DL
DIFFERENTIAL METHOD: ABNORMAL
EOSINOPHIL # BLD AUTO: 0 K/UL
EOSINOPHIL NFR BLD: 0 %
ERYTHROCYTE [DISTWIDTH] IN BLOOD BY AUTOMATED COUNT: 17.3 %
EST. GFR  (AFRICAN AMERICAN): >60 ML/MIN/1.73 M^2
EST. GFR  (NON AFRICAN AMERICAN): >60 ML/MIN/1.73 M^2
GLUCOSE SERPL-MCNC: 126 MG/DL
HCT VFR BLD AUTO: 31.6 %
HGB BLD-MCNC: 10.3 G/DL
LYMPHOCYTES # BLD AUTO: 1.9 K/UL
LYMPHOCYTES NFR BLD: 17.7 %
MCH RBC QN AUTO: 27.9 PG
MCHC RBC AUTO-ENTMCNC: 32.6 %
MCV RBC AUTO: 86 FL
MONOCYTES # BLD AUTO: 0.6 K/UL
MONOCYTES NFR BLD: 5.4 %
NEUTROPHILS # BLD AUTO: 7.9 K/UL
NEUTROPHILS NFR BLD: 76.1 %
PLATELET # BLD AUTO: 252 K/UL
PMV BLD AUTO: 10.6 FL
POTASSIUM SERPL-SCNC: 3.4 MMOL/L
PROT SERPL-MCNC: 6.5 G/DL
RBC # BLD AUTO: 3.69 M/UL
SODIUM SERPL-SCNC: 138 MMOL/L
WBC # BLD AUTO: 10.43 K/UL

## 2017-07-03 PROCEDURE — 80053 COMPREHEN METABOLIC PANEL: CPT

## 2017-07-03 PROCEDURE — 36415 COLL VENOUS BLD VENIPUNCTURE: CPT | Mod: PO

## 2017-07-03 PROCEDURE — 0502F SUBSEQUENT PRENATAL CARE: CPT | Mod: S$GLB,,, | Performed by: OBSTETRICS & GYNECOLOGY

## 2017-07-03 PROCEDURE — 99999 PR PBB SHADOW E&M-EST. PATIENT-LVL III: CPT | Mod: PBBFAC,,, | Performed by: OBSTETRICS & GYNECOLOGY

## 2017-07-03 PROCEDURE — 87086 URINE CULTURE/COLONY COUNT: CPT

## 2017-07-03 PROCEDURE — 85025 COMPLETE CBC W/AUTO DIFF WBC: CPT

## 2017-07-03 RX ORDER — SODIUM CHLORIDE, SODIUM LACTATE, POTASSIUM CHLORIDE, CALCIUM CHLORIDE 600; 310; 30; 20 MG/100ML; MG/100ML; MG/100ML; MG/100ML
INJECTION, SOLUTION INTRAVENOUS CONTINUOUS
Status: CANCELLED | OUTPATIENT
Start: 2017-07-03

## 2017-07-03 NOTE — PROGRESS NOTES
Maggy Koenig is a 26 y.o. female with Estimated Date of Delivery: 17   Obstetric History       T2      L2     SAB0   TAB0   Ectopic0   Multiple0   Live Births2         AT 38w1d  Here today on 7/3/2017 for   Chief Complaint   Patient presents with    Routine Prenatal Visit    Back Pain     severe right back pain    Abdominal Pain     right abd pain    Vomiting       Current Outpatient Prescriptions   Medication Sig Dispense Refill    PNV #22-iron [id number]-dha 28 mg iron-6 mg iron-1 mg Cmpk Take 1 tablet by mouth once daily.       promethazine (PHENERGAN) 25 MG tablet Take 1 tablet (25 mg total) by mouth every 4 (four) hours. 30 tablet 1    ranitidine (ZANTAC) 300 MG tablet Take 1 tablet (300 mg total) by mouth nightly. 30 tablet 1     No current facility-administered medications for this visit.      Review of patient's allergies indicates:  No Known Allergies    Past Medical History:   Diagnosis Date    Abnormal Pap smear of vagina     colpo    Mild preeclampsia     At 37 weeks last pregnancy       Past Surgical History:   Procedure Laterality Date     SECTION      COLPOSCOPY         OB History    Para Term  AB Living   3 2 2     2   SAB TAB Ectopic Multiple Live Births           2      # Outcome Date GA Lbr Krishna/2nd Weight Sex Delivery Anes PTL Lv   3 Current            2 Term 13 39w0d  2.977 kg (6 lb 9 oz) F CS-LTranv Spinal N KADI      Birth Comments: System Generated. Please review and update pregnancy details.   1 Term 11 37w0d  2.58 kg (5 lb 11 oz) F CS-LTranv Spinal  KADI      Birth Comments: pre-eclampsia and delivered at 37 weeks via           Social History     Social History    Marital status:      Spouse name: N/A    Number of children: N/A    Years of education: N/A     Social History Main Topics    Smoking status: Never Smoker    Smokeless tobacco: Never Used    Alcohol use No    Drug use: No    Sexual  activity: Yes     Partners: Male     Birth control/ protection: None     Other Topics Concern    Not on file     Social History Narrative    No narrative on file       Vitals:    07/03/17 1356   BP: 120/62   Pulse: 110       Review of Symptoms:  GENERAL: Denies fatigue, and malaise.   SKIN: Denies rash or lesions.   HEAD: Denies head injury or headache.   NODES: Denies enlarged lymph nodes.   CHEST: Denies chest pain or shortness of breath.   CARDIOVASCULAR: Denies palpitations or left sided chest pain.   ABDOMEN: No abdominal pain, constipation, diarrhea, nausea, vomiting or rectal bleeding.   URINARY: No frequency, urgency, dysuria, or hematuria  MUSCULOSKELETAL: Denies joint pain or swelling.   NEUROLOGIC: Denies seizures.    A POS    ASSESSMENT    Encounter Diagnoses   Name Primary?    Normal pregnancy, third trimester Yes       PLAN  1.  RTC plan for delivery    I have reviewed the blood pressure, urine results, fetal heart rate check, fundal height and agree.

## 2017-07-04 LAB — BACTERIA UR CULT: NO GROWTH

## 2017-07-11 ENCOUNTER — PATIENT MESSAGE (OUTPATIENT)
Dept: OBSTETRICS AND GYNECOLOGY | Facility: CLINIC | Age: 27
End: 2017-07-11

## 2017-07-24 ENCOUNTER — PATIENT MESSAGE (OUTPATIENT)
Dept: OBSTETRICS AND GYNECOLOGY | Facility: CLINIC | Age: 27
End: 2017-07-24

## 2017-07-25 ENCOUNTER — TELEPHONE (OUTPATIENT)
Dept: OBSTETRICS AND GYNECOLOGY | Facility: CLINIC | Age: 27
End: 2017-07-25

## 2017-07-25 NOTE — TELEPHONE ENCOUNTER
----- Message from Josette Kothari sent at 7/25/2017  8:21 AM CDT -----  Contact: self  Patient is requesting a note for , Pedro Koenig to have off for the birth of his son.  For the dates of 07/10/17 through 07/19/17 returning on 07/20/17. Work is requesting the note by 12:00 today.  Please fax to [phone].  Please call patient at [phone].  Thanks!

## 2017-07-25 NOTE — TELEPHONE ENCOUNTER
Advised pt that  needs to provide our office with correct paper work to be signed. She voiced understanding.

## 2017-08-01 ENCOUNTER — POSTPARTUM VISIT (OUTPATIENT)
Dept: OBSTETRICS AND GYNECOLOGY | Facility: CLINIC | Age: 27
End: 2017-08-01
Payer: COMMERCIAL

## 2017-08-01 VITALS
HEIGHT: 64 IN | SYSTOLIC BLOOD PRESSURE: 106 MMHG | WEIGHT: 131.19 LBS | BODY MASS INDEX: 22.4 KG/M2 | DIASTOLIC BLOOD PRESSURE: 68 MMHG

## 2017-08-01 PROCEDURE — 99999 PR PBB SHADOW E&M-EST. PATIENT-LVL III: CPT | Mod: PBBFAC,,, | Performed by: OBSTETRICS & GYNECOLOGY

## 2017-08-01 RX ORDER — NORETHINDRONE ACETATE AND ETHINYL ESTRADIOL .02; 1 MG/1; MG/1
1 TABLET ORAL DAILY
Qty: 30 TABLET | Refills: 11 | Status: SHIPPED | OUTPATIENT
Start: 2017-08-01 | End: 2018-05-18 | Stop reason: SDUPTHER

## 2017-08-01 NOTE — PROGRESS NOTES
POST-PARTUM  2017    Maggy Koenig presents today for postpartum care    OB History    Para Term  AB Living   3 3 3     3   SAB TAB Ectopic Multiple Live Births         0 3      # Outcome Date GA Lbr Krishna/2nd Weight Sex Delivery Anes PTL Lv   3 Term 07/10/17 39w1d  3.204 kg (7 lb 1 oz) M CS-LTranv Spinal N KADI   2 Term 13 39w0d  2.977 kg (6 lb 9 oz) F CS-LTranv Spinal N KADI      Birth Comments: System Generated. Please review and update pregnancy details.   1 Term 11 37w0d  2.58 kg (5 lb 11 oz) F CS-LTranv Spinal  KADI      Birth Comments: pre-eclampsia and delivered at 37 weeks via           Past Surgical History:   Procedure Laterality Date     SECTION      COLPOSCOPY         Past Medical History:   Diagnosis Date    Abnormal Pap smear of vagina     colpo    Mild preeclampsia     At 37 weeks last pregnancy     Current Outpatient Prescriptions   Medication Sig Dispense Refill    PNV #22-iron [id number] 28 mg iron-6 mg iron-1 mg Cmpk Take 1 tablet by mouth once daily.       ibuprofen (ADVIL,MOTRIN) 600 MG tablet Take 1 tablet (600 mg total) by mouth every 6 (six) hours as needed for Pain. 30 tablet 0    oxycodone-acetaminophen (PERCOCET)  mg per tablet Take 1 tablet by mouth every 4 (four) hours as needed. 30 tablet 0    promethazine (PHENERGAN) 25 MG tablet Take 1 tablet (25 mg total) by mouth every 4 (four) hours. 30 tablet 1    ranitidine (ZANTAC) 300 MG tablet Take 1 tablet (300 mg total) by mouth nightly. 30 tablet 1     No current facility-administered medications for this visit.      Review of patient's allergies indicates:  No Known Allergies  Social History     Social History    Marital status:      Spouse name: N/A    Number of children: N/A    Years of education: N/A     Social History Main Topics    Smoking status: Never Smoker    Smokeless tobacco: Never Used    Alcohol use No    Drug use: No    Sexual activity: Yes  "    Partners: Male     Birth control/ protection: None     Other Topics Concern    None     Social History Narrative    None     /68   Ht 5' 4" (1.626 m)   Wt 59.5 kg (131 lb 2.8 oz)   LMP 10/09/2016     PE:  General: Appears well  Neck: Supple, no lymphadenopathy or thyromegaly  Breasts:  Bilaterally symmetric, no masses, skin changes or abnormal nipple discharge. No axillary lymphadenopathy.  Abdomen: Soft, no tenderness, no distention, no hepatosplenomegaly  Extremities: No cords or edema  Genitourinary:  External genitalia within normal limits  Vaginal mucosa moist and pink without lesions or discharge  Cervix appears without lesions, discharge or tenderness  Uterus is involuted to normal size, shape and nontender  Adnexa: no masses or tenderness    Diagnosis:  No diagnosis found.    Plan:   1.  pap smear due:  2.  desires contraception:  "

## 2017-08-29 ENCOUNTER — PATIENT MESSAGE (OUTPATIENT)
Dept: OBSTETRICS AND GYNECOLOGY | Facility: CLINIC | Age: 27
End: 2017-08-29

## 2017-11-22 ENCOUNTER — PATIENT MESSAGE (OUTPATIENT)
Dept: OBSTETRICS AND GYNECOLOGY | Facility: CLINIC | Age: 27
End: 2017-11-22

## 2018-05-18 RX ORDER — NORETHINDRONE ACETATE AND ETHINYL ESTRADIOL .02; 1 MG/1; MG/1
1 TABLET ORAL DAILY
Qty: 30 TABLET | Refills: 11 | Status: SHIPPED | OUTPATIENT
Start: 2018-05-18 | End: 2019-02-12 | Stop reason: SDUPTHER

## 2018-08-30 ENCOUNTER — PATIENT MESSAGE (OUTPATIENT)
Dept: OBSTETRICS AND GYNECOLOGY | Facility: CLINIC | Age: 28
End: 2018-08-30

## 2018-08-30 RX ORDER — FLUCONAZOLE 150 MG/1
150 TABLET ORAL ONCE
Qty: 1 TABLET | Refills: 0 | Status: SHIPPED | OUTPATIENT
Start: 2018-08-30 | End: 2018-08-30

## 2019-02-12 RX ORDER — NORETHINDRONE ACETATE AND ETHINYL ESTRADIOL .02; 1 MG/1; MG/1
1 TABLET ORAL DAILY
Qty: 30 TABLET | Refills: 11 | Status: SHIPPED | OUTPATIENT
Start: 2019-02-12 | End: 2020-11-11 | Stop reason: ALTCHOICE

## 2019-08-27 ENCOUNTER — TELEPHONE (OUTPATIENT)
Dept: OBSTETRICS AND GYNECOLOGY | Facility: CLINIC | Age: 29
End: 2019-08-27

## 2019-08-27 ENCOUNTER — OFFICE VISIT (OUTPATIENT)
Dept: OBSTETRICS AND GYNECOLOGY | Facility: CLINIC | Age: 29
End: 2019-08-27
Payer: COMMERCIAL

## 2019-08-27 VITALS
DIASTOLIC BLOOD PRESSURE: 72 MMHG | BODY MASS INDEX: 26.29 KG/M2 | HEIGHT: 63 IN | SYSTOLIC BLOOD PRESSURE: 134 MMHG | WEIGHT: 148.38 LBS

## 2019-08-27 DIAGNOSIS — Z12.4 ENCOUNTER FOR PAP SMEAR OF CERVIX WITH HPV DNA COTESTING: Primary | ICD-10-CM

## 2019-08-27 DIAGNOSIS — B37.31 YEAST VAGINITIS: ICD-10-CM

## 2019-08-27 PROCEDURE — 99999 PR PBB SHADOW E&M-EST. PATIENT-LVL III: CPT | Mod: PBBFAC,,, | Performed by: SPECIALIST

## 2019-08-27 PROCEDURE — 3008F PR BODY MASS INDEX (BMI) DOCUMENTED: ICD-10-PCS | Mod: CPTII,S$GLB,, | Performed by: SPECIALIST

## 2019-08-27 PROCEDURE — 99999 PR PBB SHADOW E&M-EST. PATIENT-LVL III: ICD-10-PCS | Mod: PBBFAC,,, | Performed by: SPECIALIST

## 2019-08-27 PROCEDURE — 99213 OFFICE O/P EST LOW 20 MIN: CPT | Mod: S$GLB,,, | Performed by: SPECIALIST

## 2019-08-27 PROCEDURE — 87624 HPV HI-RISK TYP POOLED RSLT: CPT

## 2019-08-27 PROCEDURE — 3008F BODY MASS INDEX DOCD: CPT | Mod: CPTII,S$GLB,, | Performed by: SPECIALIST

## 2019-08-27 PROCEDURE — 88175 CYTOPATH C/V AUTO FLUID REDO: CPT

## 2019-08-27 PROCEDURE — 99213 PR OFFICE/OUTPT VISIT, EST, LEVL III, 20-29 MIN: ICD-10-PCS | Mod: S$GLB,,, | Performed by: SPECIALIST

## 2019-08-27 RX ORDER — FLUCONAZOLE 200 MG/1
200 TABLET ORAL ONCE
Qty: 1 TABLET | Refills: 0 | Status: SHIPPED | OUTPATIENT
Start: 2019-08-27 | End: 2019-08-27

## 2019-08-27 NOTE — PROGRESS NOTES
30 yo WF  presents for evaluation episode external vaginal edema, irrirtation and burning sensation approx 1 week ago which has essentially resolved. Pt denies overt d/c, dysuria, DUB,f/c, n/v. LMP approx 2 weeks ago. Not on any formal contraception  Past Medical History:   Diagnosis Date    Abnormal Pap smear of vagina     colpo    Mild preeclampsia     At 37 weeks last pregnancy       Past Surgical History:   Procedure Laterality Date     SECTION      COLPOSCOPY      DELIVERY- SECTION N/A 7/10/2017    Performed by [facility]ine [facility] L[facility]       Family History   Problem Relation Age of Onset    Breast cancer Neg Hx     Ovarian cancer Neg Hx        Social History     Socioeconomic History    Marital status:      Spouse name: Not on file    Number of children: Not on file    Years of education: Not on file    Highest education level: Not on file   Occupational History    Not on file   Social Needs    Financial resource strain: Not on file    Food insecurity:     Worry: Not on file     Inability: Not on file    Transportation needs:     Medical: Not on file     Non-medical: Not on file   Tobacco Use    Smoking status: Never Smoker    Smokeless tobacco: Never Used   Substance and Sexual Activity    Alcohol use: No    Drug use: No    Sexual activity: Yes     Partners: Male     Birth control/protection: None   Lifestyle    Physical activity:     Days per week: Not on file     Minutes per session: Not on file    Stress: Not on file   Relationships    Social connections:     Talks on phone: Not on file     Gets together: Not on file     Attends [employer] service: Not on file     Active member of club or organization: Not on file     Attends meetings of clubs or organizations: Not on file     Relationship status: Not on file   Other Topics Concern    Not on file   Social History Narrative    Not on file       Current Outpatient Medications   Medication Sig  Dispense Refill    fluconazole (DIFLUCAN) 200 MG Tab Take 1 tablet (200 mg total) by mouth once. for 1 dose 1 tablet 0    ibuprofen (ADVIL,MOTRIN) 600 MG tablet Take 1 tablet (600 mg total) by mouth every 6 (six) hours as needed for Pain. 30 tablet 0    norethindrone-ethinyl estradiol (MICROGESTIN 1/20) 1-20 mg-mcg per tablet Take 1 tablet by mouth once daily. 30 tablet 11    oxycodone-acetaminophen (PERCOCET)  mg per tablet Take 1 tablet by mouth every 4 (four) hours as needed. 30 tablet 0    PNV #22-iron [id number]-dha 28 mg iron-6 mg iron-1 mg Cmpk Take 1 tablet by mouth once daily.       promethazine (PHENERGAN) 25 MG tablet Take 1 tablet (25 mg total) by mouth every 4 (four) hours. 30 tablet 1    ranitidine (ZANTAC) 300 MG tablet Take 1 tablet (300 mg total) by mouth nightly. 30 tablet 1     No current facility-administered medications for this visit.        Review of patient's allergies indicates:   Allergen Reactions    Ciprofloxacin Hives, Itching and Rash       Review of System:   General: no chills, fever, night sweats, weight gain or weight loss  Psychological: no depression or suicidal ideation  Breasts: no new or changing breast lumps, nipple discharge or masses.  Respiratory: no cough, shortness of breath, or wheezing  Cardiovascular: no chest pain or dyspnea on exertion  Gastrointestinal: no abdominal pain, change in bowel habits, or black or bloody stools  Genito-Urinary: no incontinence, urinary frequency/urgency or vulvar/vaginal symptoms, pelvic pain or abnormal vaginal bleeding.  Musculoskeletal: no gait disturbance or muscular weakness                                              General Appearance    A and O x 4, Cooperative, no distress   Breasts    Abdomen   Deferred  Soft, non-tender, bowel sounds active all four quadrants,  no masses, no organomegaly    Genitourinary:   External rectal exam shows no thrombosed external hemorrhoids. Mild external erythema  Pelvic exam was  performed with patient supine.  No labial fusion.  There is no rash, lesion or injury on the right labia.  There is no rash, lesion or injury on the left labia.  No bleeding and no signs of injury around the vaginal introitus, urethra is without lesions and well supported. The cervix is visualized with no discharge, lesions or friability.  Vagina  White adherent d/c prsent  No significant Cystocele, Enterocele or rectocele, and uterus well supported.  Bimanual exam:  The urethra is normal to palpation and there are no palpable vaginal wall masses.  Uterus is not deviated, not enlarged, not fixed, normal shape and not tender.  Cervix exhibits no motion tenderness.   Right adnexum displays no mass and no tenderness.  Left adnexum displays no mass and no tenderness.   Extremities: Extremities normal, atraumatic, no cyanosis or edema                     PAP submitted    Residual yeast vaginitis   Will treat and pt to contact me for any recurrance  At the end of patient visit, nurse and MD both asked pt if any improvements needed in visit experience and asked whether any further pt questions or concerns.

## 2019-09-03 LAB
HPV HR 12 DNA CVX QL NAA+PROBE: NEGATIVE
HPV16 AG SPEC QL: NEGATIVE
HPV18 DNA SPEC QL NAA+PROBE: NEGATIVE

## 2019-09-20 ENCOUNTER — PATIENT MESSAGE (OUTPATIENT)
Dept: OBSTETRICS AND GYNECOLOGY | Facility: CLINIC | Age: 29
End: 2019-09-20

## 2019-09-20 RX ORDER — FLUCONAZOLE 100 MG/1
100 TABLET ORAL DAILY
Qty: 3 TABLET | Refills: 0 | Status: SHIPPED | OUTPATIENT
Start: 2019-09-20 | End: 2019-09-23

## 2019-11-26 ENCOUNTER — LAB VISIT (OUTPATIENT)
Dept: LAB | Facility: HOSPITAL | Age: 29
End: 2019-11-26
Attending: OBSTETRICS & GYNECOLOGY
Payer: COMMERCIAL

## 2019-11-26 ENCOUNTER — OFFICE VISIT (OUTPATIENT)
Dept: OBSTETRICS AND GYNECOLOGY | Facility: CLINIC | Age: 29
End: 2019-11-26
Payer: COMMERCIAL

## 2019-11-26 VITALS
HEIGHT: 63 IN | SYSTOLIC BLOOD PRESSURE: 124 MMHG | WEIGHT: 148.38 LBS | DIASTOLIC BLOOD PRESSURE: 66 MMHG | BODY MASS INDEX: 26.29 KG/M2

## 2019-11-26 DIAGNOSIS — N76.6 VULVAR ULCERATION: Primary | ICD-10-CM

## 2019-11-26 DIAGNOSIS — N89.8 VAGINAL LESION: ICD-10-CM

## 2019-11-26 DIAGNOSIS — N76.6 VULVAR ULCERATION: ICD-10-CM

## 2019-11-26 LAB
BASOPHILS # BLD AUTO: 0.03 K/UL (ref 0–0.2)
BASOPHILS NFR BLD: 0.4 % (ref 0–1.9)
DIFFERENTIAL METHOD: ABNORMAL
EOSINOPHIL # BLD AUTO: 0.1 K/UL (ref 0–0.5)
EOSINOPHIL NFR BLD: 1 % (ref 0–8)
ERYTHROCYTE [DISTWIDTH] IN BLOOD BY AUTOMATED COUNT: 11.9 % (ref 11.5–14.5)
HCT VFR BLD AUTO: 43.7 % (ref 37–48.5)
HGB BLD-MCNC: 13.6 G/DL (ref 12–16)
IMM GRANULOCYTES # BLD AUTO: 0.01 K/UL (ref 0–0.04)
IMM GRANULOCYTES NFR BLD AUTO: 0.1 % (ref 0–0.5)
LYMPHOCYTES # BLD AUTO: 2.5 K/UL (ref 1–4.8)
LYMPHOCYTES NFR BLD: 35.1 % (ref 18–48)
MCH RBC QN AUTO: 29.8 PG (ref 27–31)
MCHC RBC AUTO-ENTMCNC: 31.1 G/DL (ref 32–36)
MCV RBC AUTO: 96 FL (ref 82–98)
MONOCYTES # BLD AUTO: 0.4 K/UL (ref 0.3–1)
MONOCYTES NFR BLD: 6 % (ref 4–15)
NEUTROPHILS # BLD AUTO: 4 K/UL (ref 1.8–7.7)
NEUTROPHILS NFR BLD: 57.4 % (ref 38–73)
NRBC BLD-RTO: 0 /100 WBC
PLATELET # BLD AUTO: 195 K/UL (ref 150–350)
PMV BLD AUTO: 11.8 FL (ref 9.2–12.9)
RBC # BLD AUTO: 4.56 M/UL (ref 4–5.4)
WBC # BLD AUTO: 7.03 K/UL (ref 3.9–12.7)

## 2019-11-26 PROCEDURE — 99213 OFFICE O/P EST LOW 20 MIN: CPT | Mod: S$GLB,,, | Performed by: OBSTETRICS & GYNECOLOGY

## 2019-11-26 PROCEDURE — 86696 HERPES SIMPLEX TYPE 2 TEST: CPT

## 2019-11-26 PROCEDURE — 87255 GENET VIRUS ISOLATE HSV: CPT

## 2019-11-26 PROCEDURE — 3008F BODY MASS INDEX DOCD: CPT | Mod: CPTII,S$GLB,, | Performed by: OBSTETRICS & GYNECOLOGY

## 2019-11-26 PROCEDURE — 99213 PR OFFICE/OUTPT VISIT, EST, LEVL III, 20-29 MIN: ICD-10-PCS | Mod: S$GLB,,, | Performed by: OBSTETRICS & GYNECOLOGY

## 2019-11-26 PROCEDURE — 3008F PR BODY MASS INDEX (BMI) DOCUMENTED: ICD-10-PCS | Mod: CPTII,S$GLB,, | Performed by: OBSTETRICS & GYNECOLOGY

## 2019-11-26 PROCEDURE — 99999 PR PBB SHADOW E&M-EST. PATIENT-LVL III: ICD-10-PCS | Mod: PBBFAC,,, | Performed by: OBSTETRICS & GYNECOLOGY

## 2019-11-26 PROCEDURE — 86694 HERPES SIMPLEX NES ANTBDY: CPT

## 2019-11-26 PROCEDURE — 36415 COLL VENOUS BLD VENIPUNCTURE: CPT | Mod: PO

## 2019-11-26 PROCEDURE — 87252 VIRUS INOCULATION TISSUE: CPT

## 2019-11-26 PROCEDURE — 99999 PR PBB SHADOW E&M-EST. PATIENT-LVL III: CPT | Mod: PBBFAC,,, | Performed by: OBSTETRICS & GYNECOLOGY

## 2019-11-26 PROCEDURE — 36415 COLL VENOUS BLD VENIPUNCTURE: CPT

## 2019-11-26 PROCEDURE — 86592 SYPHILIS TEST NON-TREP QUAL: CPT

## 2019-11-26 PROCEDURE — 30000890 MISCELLANEOUS SENDOUT TEST, BLOOD

## 2019-11-26 PROCEDURE — 85025 COMPLETE CBC W/AUTO DIFF WBC: CPT

## 2019-11-26 PROCEDURE — 87529 HSV DNA AMP PROBE: CPT

## 2019-11-26 RX ORDER — VALACYCLOVIR HYDROCHLORIDE 1 G/1
1000 TABLET, FILM COATED ORAL EVERY 12 HOURS
Qty: 20 TABLET | Refills: 0 | Status: SHIPPED | OUTPATIENT
Start: 2019-11-26 | End: 2020-05-28 | Stop reason: SDUPTHER

## 2019-11-26 NOTE — PROGRESS NOTES
Chief Complaint   Patient presents with    Rash       History of Present Illness: Maggy Koenig is a 29 y.o. female that presents today 2019 for   Chief Complaint   Patient presents with    Rash   for 1 week. Itched at first then hurt and then spread and feels better.       Past Medical History:   Diagnosis Date    Abnormal Pap smear of vagina     colpo    Mild preeclampsia     At 37 weeks last pregnancy       Past Surgical History:   Procedure Laterality Date     SECTION      COLPOSCOPY         Current Outpatient Medications   Medication Sig Dispense Refill    ibuprofen (ADVIL,MOTRIN) 600 MG tablet Take 1 tablet (600 mg total) by mouth every 6 (six) hours as needed for Pain. 30 tablet 0    norethindrone-ethinyl estradiol (MICROGESTIN ) 1-20 mg-mcg per tablet Take 1 tablet by mouth once daily. (Patient not taking: Reported on 2019) 30 tablet 11    oxycodone-acetaminophen (PERCOCET)  mg per tablet Take 1 tablet by mouth every 4 (four) hours as needed. (Patient not taking: Reported on 2019) 30 tablet 0    PNV #22-iron [id number] 28 mg iron-6 mg iron-1 mg Cmpk Take 1 tablet by mouth once daily.       promethazine (PHENERGAN) 25 MG tablet Take 1 tablet (25 mg total) by mouth every 4 (four) hours. (Patient not taking: Reported on 2019) 30 tablet 1    ranitidine (ZANTAC) 300 MG tablet Take 1 tablet (300 mg total) by mouth nightly. (Patient not taking: Reported on 2019) 30 tablet 1    valACYclovir (VALTREX) 1000 MG tablet Take 1 tablet (1,000 mg total) by mouth every 12 (twelve) hours. for 10 days 20 tablet 0     No current facility-administered medications for this visit.        Review of patient's allergies indicates:   Allergen Reactions    Ciprofloxacin Hives, Itching and Rash       Family History   Problem Relation Age of Onset    Breast cancer Neg Hx     Ovarian cancer Neg Hx        Social History     Tobacco Use    Smoking status: Never  "Smoker    Smokeless tobacco: Never Used   Substance Use Topics    Alcohol use: No    Drug use: No       OB History    Para Term  AB Living   3 3 3     3   SAB TAB Ectopic Multiple Live Births         0 3      # Outcome Date GA Lbr Krishna/2nd Weight Sex Delivery Anes PTL Lv   3 Term 07/10/17 39w1d  3.204 kg (7 lb 1 oz) M CS-LTranv Spinal N KADI   2 Term 13 39w0d  2.977 kg (6 lb 9 oz) F CS-LTranv Spinal N KADI      Birth Comments: System Generated. Please review and update pregnancy details.   1 Term 11 37w0d  2.58 kg (5 lb 11 oz) F CS-LTranv Spinal  KDAI      Birth Comments: pre-eclampsia and delivered at 37 weeks via        Review of Symptoms:  GENERAL: Denies weight gain or weight loss. Feeling well overall.   SKIN: Denies rash or lesions.   HEAD: Denies head injury or headache.   NODES: Denies enlarged lymph nodes.   CHEST: Denies chest pain or shortness of breath.   CARDIOVASCULAR: Denies palpitations or left sided chest pain.   ABDOMEN: No abdominal pain, constipation, diarrhea, nausea, vomiting or rectal bleeding.   URINARY: No frequency, dysuria, hematuria, or burning on urination.  HEMATOLOGIC: No easy bruisability or excessive bleeding.   MUSCULOSKELETAL: Denies joint pain or swelling.     /66   Ht 5' 3" (1.6 m)   Wt 67.3 kg (148 lb 5.9 oz)   LMP 2019   Physical Exam:  APPEARANCE: Well nourished, well developed, in no acute distress.  SKIN: Normal skin turgor, no lesions.  NECK: Neck symmetric without masses   RESPIRATORY: Normal respiratory effort with no retractions or use of accessory muscles  CARDIOVASCULAR: Peripheral vascular system with no swelling no varicosities and palpation of pulses normal  LYMPHATIC: No enlargements of the lymph nodes noted in the neck, axillae, or groin  ABDOMEN: Soft. No tenderness or masses. No hepatosplenomegaly. No hernias.  PELVIC: Normal external female genitalia with ++ ulcers covering her perineum and anus " ++++  EXTREMITIES: No clubbing cyanosis or edema.    ASSESSMENT/PLAN:  Vulvar ulceration  -     Herpes simplex Virus (HSV) Type 1 & 2 DNA by PCR; Future; Expected date: 11/26/2019  -     Herpes simplex type 1 & 2 IgM,Herpes IgM; Future; Expected date: 11/26/2019  -     Herpes simplex type 1&2 IgG,Herpes titer; Future; Expected date: 11/26/2019  -     RPR; Future; Expected date: 11/26/2019  -     CBC auto differential; Future; Expected date: 11/26/2019    Vaginal lesion  -     Viral Culture [facility]; Vagina    Other orders  -     valACYclovir (VALTREX) 1000 MG tablet; Take 1 tablet (1,000 mg total) by mouth every 12 (twelve) hours. for 10 days  Dispense: 20 tablet; Refill: 0        15 minutes spent today with this patient. Greater than half spent in counseling today.

## 2019-11-27 LAB — RPR SER QL: NORMAL

## 2019-11-29 LAB
HSV AB, IGM BY EIA: NEGATIVE
HSV-1 DNA BY PCR: NEGATIVE
HSV-2 DNA BY PCR: NEGATIVE
HSV1 IGG SERPL QL IA: POSITIVE
HSV2 IGG SERPL QL IA: NEGATIVE

## 2019-12-12 LAB
MISCELLANEOUS TEST NAME: NORMAL
REFERENCE LAB: NORMAL
SPECIMEN TYPE: NORMAL
TEST RESULT: NORMAL

## 2020-04-03 ENCOUNTER — PATIENT MESSAGE (OUTPATIENT)
Dept: OBSTETRICS AND GYNECOLOGY | Facility: CLINIC | Age: 30
End: 2020-04-03

## 2020-04-03 RX ORDER — NITROFURANTOIN 25; 75 MG/1; MG/1
100 CAPSULE ORAL 2 TIMES DAILY
Qty: 14 CAPSULE | Refills: 0 | Status: SHIPPED | OUTPATIENT
Start: 2020-04-03 | End: 2020-04-10

## 2020-05-29 RX ORDER — VALACYCLOVIR HYDROCHLORIDE 1 G/1
1000 TABLET, FILM COATED ORAL EVERY 12 HOURS
Qty: 20 TABLET | Refills: 0 | Status: SHIPPED | OUTPATIENT
Start: 2020-05-29 | End: 2021-03-17

## 2020-08-18 ENCOUNTER — PATIENT OUTREACH (OUTPATIENT)
Dept: ADMINISTRATIVE | Facility: HOSPITAL | Age: 30
End: 2020-08-18

## 2020-08-18 ENCOUNTER — OFFICE VISIT (OUTPATIENT)
Dept: INTERNAL MEDICINE | Facility: CLINIC | Age: 30
End: 2020-08-18
Payer: COMMERCIAL

## 2020-08-18 DIAGNOSIS — Z11.59 NEED FOR HEPATITIS C SCREENING TEST: ICD-10-CM

## 2020-08-18 DIAGNOSIS — E78.5 HYPERLIPIDEMIA, UNSPECIFIED HYPERLIPIDEMIA TYPE: Primary | ICD-10-CM

## 2020-08-18 DIAGNOSIS — B34.9 VIRAL SYNDROME: Primary | ICD-10-CM

## 2020-08-18 PROCEDURE — 99201 PR OFFICE/OUTPT VISIT,NEW,LEVL I: ICD-10-PCS | Mod: 95,,, | Performed by: INTERNAL MEDICINE

## 2020-08-18 PROCEDURE — 99201 PR OFFICE/OUTPT VISIT,NEW,LEVL I: CPT | Mod: 95,,, | Performed by: INTERNAL MEDICINE

## 2020-08-18 NOTE — PROGRESS NOTES
The patient location is:  home  The chief complaint leading to consultation is:  A viral syndrome, follow-up, return to work    Visit type: audiovisual    Face to Face time with patient:  15 min  Fifteen minutes of total time spent on the encounter, which includes face to face time and non-face to face time preparing to see the patient (eg, review of tests), Obtaining and/or reviewing separately obtained history, Documenting clinical information in the electronic or other health record, Independently interpreting results (not separately reported) and communicating results to the patient/family/caregiver, or Care coordination (not separately reported).         Each patient to whom he or she provides medical services by telemedicine is:  (1) informed of the relationship between the physician and patient and the respective role of any other health care provider with respect to management of the patient; and (2) notified that he or she may decline to receive medical services by telemedicine and may withdraw from such care at any time.    Notes:     A 29-year-old female,  and [place]    On August 7th, started noticing mild headache, loss of taste and smell, episodic left ear discomfort, slight fatigue, no fever, temperature was 98.7°.  She did not report breathing difficulty, cough, chest congestion   had similar symptoms 2 days prior  In a couple of days practically all improved in at present feels closely back to normal other than just very minimal fatigue    Medical history-no major health conditions  Medications-no prescribed medication    Impression:  Viral syndrome    Plan:  The patient is proved to return to work Thursday August 20th, particularly since she has had since straight days of progressive improvement and symptoms essentially resolved.  Proper precautionary measures discussed

## 2020-09-13 ENCOUNTER — NURSE TRIAGE (OUTPATIENT)
Dept: ADMINISTRATIVE | Facility: CLINIC | Age: 30
End: 2020-09-13

## 2020-09-13 ENCOUNTER — PATIENT MESSAGE (OUTPATIENT)
Dept: OBSTETRICS AND GYNECOLOGY | Facility: CLINIC | Age: 30
End: 2020-09-13
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
## 2020-09-15 RX ORDER — FLUCONAZOLE 150 MG/1
150 TABLET ORAL ONCE
Qty: 1 TABLET | Refills: 0 | Status: SHIPPED | OUTPATIENT
Start: 2020-09-15 | End: 2020-09-15

## 2020-11-11 ENCOUNTER — OFFICE VISIT (OUTPATIENT)
Dept: OBSTETRICS AND GYNECOLOGY | Facility: CLINIC | Age: 30
End: 2020-11-11
Payer: COMMERCIAL

## 2020-11-11 ENCOUNTER — LAB VISIT (OUTPATIENT)
Dept: LAB | Facility: HOSPITAL | Age: 30
End: 2020-11-11
Attending: OBSTETRICS & GYNECOLOGY
Payer: COMMERCIAL

## 2020-11-11 VITALS
BODY MASS INDEX: 24.5 KG/M2 | WEIGHT: 138.25 LBS | DIASTOLIC BLOOD PRESSURE: 74 MMHG | HEIGHT: 63 IN | SYSTOLIC BLOOD PRESSURE: 114 MMHG

## 2020-11-11 DIAGNOSIS — Z01.419 WELL FEMALE EXAM WITH ROUTINE GYNECOLOGICAL EXAM: Primary | ICD-10-CM

## 2020-11-11 DIAGNOSIS — Z32.00 POSSIBLE PREGNANCY: ICD-10-CM

## 2020-11-11 LAB
ABO + RH BLD: NORMAL
B-HCG UR QL: POSITIVE
BASOPHILS # BLD AUTO: 0.03 K/UL (ref 0–0.2)
BASOPHILS NFR BLD: 0.3 % (ref 0–1.9)
BLD GP AB SCN CELLS X3 SERPL QL: NORMAL
CTP QC/QA: YES
DIFFERENTIAL METHOD: NORMAL
EOSINOPHIL # BLD AUTO: 0 K/UL (ref 0–0.5)
EOSINOPHIL NFR BLD: 0.4 % (ref 0–8)
ERYTHROCYTE [DISTWIDTH] IN BLOOD BY AUTOMATED COUNT: 12.8 % (ref 11.5–14.5)
HCT VFR BLD AUTO: 37.7 % (ref 37–48.5)
HGB BLD-MCNC: 12.1 G/DL (ref 12–16)
IMM GRANULOCYTES # BLD AUTO: 0.03 K/UL (ref 0–0.04)
IMM GRANULOCYTES NFR BLD AUTO: 0.3 % (ref 0–0.5)
LYMPHOCYTES # BLD AUTO: 2.2 K/UL (ref 1–4.8)
LYMPHOCYTES NFR BLD: 22.1 % (ref 18–48)
MCH RBC QN AUTO: 30 PG (ref 27–31)
MCHC RBC AUTO-ENTMCNC: 32.1 G/DL (ref 32–36)
MCV RBC AUTO: 93 FL (ref 82–98)
MONOCYTES # BLD AUTO: 0.6 K/UL (ref 0.3–1)
MONOCYTES NFR BLD: 6.3 % (ref 4–15)
NEUTROPHILS # BLD AUTO: 7.1 K/UL (ref 1.8–7.7)
NEUTROPHILS NFR BLD: 70.6 % (ref 38–73)
NRBC BLD-RTO: 0 /100 WBC
PLATELET # BLD AUTO: 191 K/UL (ref 150–350)
PMV BLD AUTO: 12.5 FL (ref 9.2–12.9)
RBC # BLD AUTO: 4.04 M/UL (ref 4–5.4)
TSH SERPL DL<=0.005 MIU/L-ACNC: 1.34 UIU/ML (ref 0.4–4)
WBC # BLD AUTO: 10 K/UL (ref 3.9–12.7)

## 2020-11-11 PROCEDURE — 99395 PREV VISIT EST AGE 18-39: CPT | Mod: S$GLB,,, | Performed by: OBSTETRICS & GYNECOLOGY

## 2020-11-11 PROCEDURE — 87340 HEPATITIS B SURFACE AG IA: CPT

## 2020-11-11 PROCEDURE — 87086 URINE CULTURE/COLONY COUNT: CPT

## 2020-11-11 PROCEDURE — 86901 BLOOD TYPING SEROLOGIC RH(D): CPT

## 2020-11-11 PROCEDURE — 36415 COLL VENOUS BLD VENIPUNCTURE: CPT | Mod: PO

## 2020-11-11 PROCEDURE — 99999 PR PBB SHADOW E&M-EST. PATIENT-LVL III: ICD-10-PCS | Mod: PBBFAC,,, | Performed by: OBSTETRICS & GYNECOLOGY

## 2020-11-11 PROCEDURE — 84443 ASSAY THYROID STIM HORMONE: CPT

## 2020-11-11 PROCEDURE — 86703 HIV-1/HIV-2 1 RESULT ANTBDY: CPT

## 2020-11-11 PROCEDURE — 99999 PR PBB SHADOW E&M-EST. PATIENT-LVL III: CPT | Mod: PBBFAC,,, | Performed by: OBSTETRICS & GYNECOLOGY

## 2020-11-11 PROCEDURE — 85025 COMPLETE CBC W/AUTO DIFF WBC: CPT

## 2020-11-11 PROCEDURE — 99395 PR PREVENTIVE VISIT,EST,18-39: ICD-10-PCS | Mod: S$GLB,,, | Performed by: OBSTETRICS & GYNECOLOGY

## 2020-11-11 PROCEDURE — 86762 RUBELLA ANTIBODY: CPT

## 2020-11-11 PROCEDURE — 86592 SYPHILIS TEST NON-TREP QUAL: CPT

## 2020-11-11 NOTE — PROGRESS NOTES
Chief Complaint   Patient presents with    Possible Pregnancy       History of Present Illness: Maggy Koenig is a 30 y.o. female that presents today 2020 for well gyn visit.    Past Medical History:   Diagnosis Date    Abnormal Pap smear of vagina     colpo    Mild preeclampsia     At 37 weeks last pregnancy       Past Surgical History:   Procedure Laterality Date     SECTION      COLPOSCOPY         Current Outpatient Medications   Medication Sig Dispense Refill    PNV #22-iron [id number] 28 mg iron-6 mg iron-1 mg Cmpk Take 1 tablet by mouth once daily.       valACYclovir (VALTREX) 1000 MG tablet Take 1 tablet (1,000 mg total) by mouth every 12 (twelve) hours. for 10 days 20 tablet 0     No current facility-administered medications for this visit.        Review of patient's allergies indicates:   Allergen Reactions    Ciprofloxacin Hives, Itching and Rash       Family History   Problem Relation Age of Onset    Breast cancer Neg Hx     Ovarian cancer Neg Hx        Social History     Socioeconomic History    Marital status:      Spouse name: Not on file    Number of children: Not on file    Years of education: Not on file    Highest education level: Not on file   Occupational History    Not on file   Social Needs    Financial resource strain: Not on file    Food insecurity     Worry: Not on file     Inability: Not on file    Transportation needs     Medical: Not on file     Non-medical: Not on file   Tobacco Use    Smoking status: Never Smoker    Smokeless tobacco: Never Used   Substance and Sexual Activity    Alcohol use: No    Drug use: No    Sexual activity: Yes     Partners: Male     Birth control/protection: None   Lifestyle    Physical activity     Days per week: Not on file     Minutes per session: Not on file    Stress: Not on file   Relationships    Social connections     Talks on phone: Not on file     Gets together: Not on file     Attends  "Jewish service: Not on file     Active member of club or organization: Not on file     Attends meetings of clubs or organizations: Not on file     Relationship status: Not on file   Other Topics Concern    Not on file   Social History Narrative    Not on file       OB History    Para Term  AB Living   3 3 3     3   SAB TAB Ectopic Multiple Live Births         0 3      # Outcome Date GA Lbr Krishna/2nd Weight Sex Delivery Anes PTL Lv   3 Term 07/10/17 39w1d  3.204 kg (7 lb 1 oz) M CS-LTranv Spinal N KADI   2 Term 13 39w0d  2.977 kg (6 lb 9 oz) F CS-LTranv Spinal N KADI      Birth Comments: System Generated. Please review and update pregnancy details.   1 Term 11 37w0d  2.58 kg (5 lb 11 oz) F CS-LTranv Spinal  KADI      Birth Comments: pre-eclampsia and delivered at 37 weeks via        Review of Symptoms:  GENERAL: Denies weight gain or weight loss. Feeling well overall.   SKIN: Denies rash or lesions.   HEAD: Denies head injury or headache.   NODES: Denies enlarged lymph nodes.   CHEST: Denies chest pain or shortness of breath.   CARDIOVASCULAR: Denies palpitations or left sided chest pain.   ABDOMEN: No abdominal pain, constipation, diarrhea, nausea, vomiting or rectal bleeding.   URINARY: No frequency, dysuria, hematuria, or burning on urination.  HEMATOLOGIC: No easy bruisability or excessive bleeding.   MUSCULOSKELETAL: Denies joint pain or swelling.     /74   Ht 5' 3" (1.6 m)   Wt 62.7 kg (138 lb 3.7 oz)   LMP 2020 (Approximate)   Physical Exam:  APPEARANCE: Well nourished, well developed, in no acute distress.  SKIN: Normal skin turgor, no lesions.  NECK: Neck symmetric without masses   RESPIRATORY: Normal respiratory effort with no retractions or use of accessory muscles  CARDIOVASCULAR: Peripheral vascular system with no swelling no varicosities and palpation of pulses normal  LYMPHATIC: No enlargements of the lymph nodes noted in the neck, axillae, or " groin  ABDOMEN: Soft. No tenderness or masses. No hepatosplenomegaly. No hernias.  BREASTS: Symmetrical, no skin changes or visible lesions. No palpable masses, nipple discharge or adenopathy bilaterally.  PELVIC: Normal external female genitalia without lesions. Normal hair distribution. Adequate perineal body, normal urethral meatus. Urethra with no masses.  Bladder nontender. Vagina moist and well rugated without lesions or discharge. Cervix pink and without lesions. No significant cystocele or rectocele. Bimanual exam showed uterus normal size, shape, position, mobile and nontender. Adnexa without masses or tenderness. Urethra and bladder normal.   EXTREMITIES: No clubbing cyanosis or edema.    ASSESSMENT/PLAN:  Well female exam with routine gynecological exam    Possible pregnancy  -     POCT Urine Pregnancy  -     Urine culture  -     HIV 1/2 Ag/Ab (4th Gen); Future; Expected date: 11/11/2020  -     RPR; Future; Expected date: 11/11/2020  -     Hepatitis B Surface Antigen; Future; Expected date: 11/11/2020  -     Rubella Antibody, IgG; Future; Expected date: 11/11/2020  -     TSH; Future; Expected date: 11/11/2020  -     CBC Auto Differential; Future; Expected date: 11/11/2020  -     Post Partum Type and Screen; Future; Expected date: 11/11/2020  -     US OB/GYN In Clinic Procedure (Non [facility])- Today      Pap 9/2021      Patient was counseled today on Pelvic exams and Pap Smear guidelines.   We discussed STD screening if at high risk for a STD.  We discussed recommendation for breast cancer screening with mammogram every other year after the age of 40 and annually after the age of 50.    We discussed colon cancer screening when indicated.   Osteoporosis screening discussed when indicated.   She was advised to see her primary care physician for all other health maintenance.     FOLLOW-UP with me for next routine visit.

## 2020-11-11 NOTE — PROCEDURES
Procedures     ULTRASOUND:   Ultrasound performed in the usual fashion, showing viable hagen intrauterine pregnancy, crown-rump length =  27.2  mm with flicker,   consistent with  LMP       and EDC 6/12/2021  No free fluid in cul-de-sac or adnexal pathology.

## 2020-11-12 LAB
HBV SURFACE AG SERPL QL IA: NEGATIVE
HIV 1+2 AB+HIV1 P24 AG SERPL QL IA: NEGATIVE
RPR SER QL: NORMAL
RUBV IGG SER-ACNC: 65.3 IU/ML
RUBV IGG SER-IMP: REACTIVE

## 2020-11-13 LAB — BACTERIA UR CULT: NO GROWTH

## 2020-12-16 ENCOUNTER — PATIENT MESSAGE (OUTPATIENT)
Dept: ADMINISTRATIVE | Facility: OTHER | Age: 30
End: 2020-12-16

## 2020-12-16 ENCOUNTER — ROUTINE PRENATAL (OUTPATIENT)
Dept: OBSTETRICS AND GYNECOLOGY | Facility: CLINIC | Age: 30
End: 2020-12-16
Payer: COMMERCIAL

## 2020-12-16 VITALS — DIASTOLIC BLOOD PRESSURE: 70 MMHG | WEIGHT: 138.88 LBS | BODY MASS INDEX: 24.6 KG/M2 | SYSTOLIC BLOOD PRESSURE: 120 MMHG

## 2020-12-16 DIAGNOSIS — Z3A.15 15 WEEKS GESTATION OF PREGNANCY: ICD-10-CM

## 2020-12-16 DIAGNOSIS — Z34.91 NORMAL PREGNANCY IN FIRST TRIMESTER: Primary | ICD-10-CM

## 2020-12-16 DIAGNOSIS — Z98.891 H/O: C-SECTION: ICD-10-CM

## 2020-12-16 LAB
BILIRUB SERPL-MCNC: NEGATIVE MG/DL
BLOOD URINE, POC: NEGATIVE
CLARITY, POC UA: NORMAL
COLOR, POC UA: NORMAL
GLUCOSE UR QL STRIP: NORMAL
KETONES UR QL STRIP: NEGATIVE
LEUKOCYTE ESTERASE URINE, POC: NEGATIVE
NITRITE, POC UA: NEGATIVE
PH, POC UA: 5
PROTEIN, POC: NORMAL
SPECIFIC GRAVITY, POC UA: NORMAL
UROBILINOGEN, POC UA: NORMAL

## 2020-12-16 PROCEDURE — 0502F PR SUBSEQUENT PRENATAL CARE: ICD-10-PCS | Mod: CPTII,S$GLB,, | Performed by: OBSTETRICS & GYNECOLOGY

## 2020-12-16 PROCEDURE — 99999 PR PBB SHADOW E&M-EST. PATIENT-LVL III: ICD-10-PCS | Mod: PBBFAC,,, | Performed by: OBSTETRICS & GYNECOLOGY

## 2020-12-16 PROCEDURE — 99999 PR PBB SHADOW E&M-EST. PATIENT-LVL III: CPT | Mod: PBBFAC,,, | Performed by: OBSTETRICS & GYNECOLOGY

## 2020-12-16 PROCEDURE — 0502F SUBSEQUENT PRENATAL CARE: CPT | Mod: CPTII,S$GLB,, | Performed by: OBSTETRICS & GYNECOLOGY

## 2020-12-16 RX ORDER — HYDROXYZINE PAMOATE 25 MG/1
25 CAPSULE ORAL 4 TIMES DAILY
Qty: 60 CAPSULE | Refills: 1 | Status: SHIPPED | OUTPATIENT
Start: 2020-12-16 | End: 2021-03-17

## 2020-12-16 NOTE — PROGRESS NOTES
Maggy Koenig is a 30 y.o. female with Estimated Date of Delivery: 21   OB History    Para Term  AB Living   4 3 3 0 0 3   SAB TAB Ectopic Multiple Live Births   0 0 0 0 3        AT 15w0d  Here today on 2020 for No chief complaint on file.      Current Outpatient Medications   Medication Sig Dispense Refill    PNV #22-iron [id number] 28 mg iron-6 mg iron-1 mg Cmpk Take 1 tablet by mouth once daily.       valACYclovir (VALTREX) 1000 MG tablet Take 1 tablet (1,000 mg total) by mouth every 12 (twelve) hours. for 10 days 20 tablet 0     No current facility-administered medications for this visit.      Review of patient's allergies indicates:   Allergen Reactions    Ciprofloxacin Hives, Itching and Rash       Past Medical History:   Diagnosis Date    Abnormal Pap smear of vagina     colpo    Mild preeclampsia     At 37 weeks last pregnancy       Past Surgical History:   Procedure Laterality Date     SECTION      COLPOSCOPY         OB History    Para Term  AB Living   4 3 3     3   SAB TAB Ectopic Multiple Live Births         0 3      # Outcome Date GA Lbr Krishna/2nd Weight Sex Delivery Anes PTL Lv   4 Current            3 Term 07/10/17 39w1d  3.204 kg (7 lb 1 oz) M CS-LTranv Spinal N KADI   2 Term 13 39w0d  2.977 kg (6 lb 9 oz) F CS-LTranv Spinal N KADI      Birth Comments: System Generated. Please review and update pregnancy details.   1 Term 11 37w0d  2.58 kg (5 lb 11 oz) F CS-LTranv Spinal  KADI      Birth Comments: pre-eclampsia and delivered at 37 weeks via        Social History     Socioeconomic History    Marital status:      Spouse name: Not on file    Number of children: Not on file    Years of education: Not on file    Highest education level: Not on file   Occupational History    Not on file   Social Needs    Financial resource strain: Not on file    Food insecurity     Worry: Not on file     Inability: Not on  file    Transportation needs     Medical: Not on file     Non-medical: Not on file   Tobacco Use    Smoking status: Never Smoker    Smokeless tobacco: Never Used   Substance and Sexual Activity    Alcohol use: No    Drug use: No    Sexual activity: Yes     Partners: Male     Birth control/protection: None   Lifestyle    Physical activity     Days per week: Not on file     Minutes per session: Not on file    Stress: Not on file   Relationships    Social connections     Talks on phone: Not on file     Gets together: Not on file     Attends Mandaeism service: Not on file     Active member of club or organization: Not on file     Attends meetings of clubs or organizations: Not on file     Relationship status: Not on file   Other Topics Concern    Not on file   Social History Narrative    Not on file       There were no vitals filed for this visit.    Review of Symptoms:  GENERAL: Denies fatigue, and malaise.   ABDOMEN: No abdominal pain, constipation, diarrhea, nausea, vomiting or rectal bleeding.   URINARY: No frequency, urgency, dysuria, or hematuria      A POS    Encounter Diagnoses   Name Primary?    Normal pregnancy in first trimester Yes    15 weeks gestation of pregnancy        PLAN    Normal pregnancy in first trimester  -     POCT URINE DIPSTICK WITHOUT MICROSCOPE  -     US OB 14+ Wks, TransAbd, Single Gestation; Future; Expected date: 12/16/2020  -     Connected MOM Enrollment  -     [facility] Connected MOM Program Consent Questionnaire    15 weeks gestation of pregnancy      RTC 4 weeks for the USG     I reviewed today her blood pressure, urine results and  fetal heart rate.    I have reviewed the previous labs and ultrasound with the patient.     I have answered questions to her satisfaction.      Total of 20 minutes spend today

## 2021-01-13 ENCOUNTER — ROUTINE PRENATAL (OUTPATIENT)
Dept: OBSTETRICS AND GYNECOLOGY | Facility: CLINIC | Age: 31
End: 2021-01-13
Payer: COMMERCIAL

## 2021-01-13 ENCOUNTER — HOSPITAL ENCOUNTER (OUTPATIENT)
Dept: RADIOLOGY | Facility: HOSPITAL | Age: 31
Discharge: HOME | End: 2021-01-13
Attending: OBSTETRICS & GYNECOLOGY
Payer: COMMERCIAL

## 2021-01-13 VITALS
DIASTOLIC BLOOD PRESSURE: 68 MMHG | SYSTOLIC BLOOD PRESSURE: 112 MMHG | WEIGHT: 140.88 LBS | BODY MASS INDEX: 24.95 KG/M2

## 2021-01-13 DIAGNOSIS — Z98.891 H/O: C-SECTION: ICD-10-CM

## 2021-01-13 DIAGNOSIS — O09.92 HIGH-RISK PREGNANCY IN SECOND TRIMESTER: ICD-10-CM

## 2021-01-13 DIAGNOSIS — Z34.91 NORMAL PREGNANCY IN FIRST TRIMESTER: ICD-10-CM

## 2021-01-13 DIAGNOSIS — Z3A.19 19 WEEKS GESTATION OF PREGNANCY: Primary | ICD-10-CM

## 2021-01-13 LAB
BILIRUB SERPL-MCNC: NEGATIVE MG/DL
BLOOD URINE, POC: NEGATIVE
CLARITY, POC UA: NORMAL
COLOR, POC UA: NORMAL
GLUCOSE UR QL STRIP: NORMAL
KETONES UR QL STRIP: NORMAL
LEUKOCYTE ESTERASE URINE, POC: NEGATIVE
NITRITE, POC UA: NEGATIVE
PH, POC UA: 6
PROTEIN, POC: NORMAL
SPECIFIC GRAVITY, POC UA: NORMAL
UROBILINOGEN, POC UA: NORMAL

## 2021-01-13 PROCEDURE — 99999 PR PBB SHADOW E&M-EST. PATIENT-LVL III: CPT | Mod: PBBFAC,,, | Performed by: OBSTETRICS & GYNECOLOGY

## 2021-01-13 PROCEDURE — 76805 US OB 14+ WEEKS, TRANSABDOM, SINGLE GESTATION: ICD-10-PCS | Mod: 26,,, | Performed by: RADIOLOGY

## 2021-01-13 PROCEDURE — 99999 PR PBB SHADOW E&M-EST. PATIENT-LVL III: ICD-10-PCS | Mod: PBBFAC,,, | Performed by: OBSTETRICS & GYNECOLOGY

## 2021-01-13 PROCEDURE — 76805 OB US >/= 14 WKS SNGL FETUS: CPT | Mod: 26,,, | Performed by: RADIOLOGY

## 2021-01-13 PROCEDURE — 0502F SUBSEQUENT PRENATAL CARE: CPT | Mod: CPTII,S$GLB,, | Performed by: OBSTETRICS & GYNECOLOGY

## 2021-01-13 PROCEDURE — 76805 OB US >/= 14 WKS SNGL FETUS: CPT | Mod: TC,PN

## 2021-01-13 PROCEDURE — 0502F PR SUBSEQUENT PRENATAL CARE: ICD-10-PCS | Mod: CPTII,S$GLB,, | Performed by: OBSTETRICS & GYNECOLOGY

## 2021-02-16 ENCOUNTER — PATIENT MESSAGE (OUTPATIENT)
Dept: OBSTETRICS AND GYNECOLOGY | Facility: CLINIC | Age: 31
End: 2021-02-16

## 2021-02-18 ENCOUNTER — LAB VISIT (OUTPATIENT)
Dept: LAB | Facility: HOSPITAL | Age: 31
End: 2021-02-18
Attending: OBSTETRICS & GYNECOLOGY
Payer: COMMERCIAL

## 2021-02-18 ENCOUNTER — ROUTINE PRENATAL (OUTPATIENT)
Dept: OBSTETRICS AND GYNECOLOGY | Facility: CLINIC | Age: 31
End: 2021-02-18
Payer: COMMERCIAL

## 2021-02-18 ENCOUNTER — PATIENT MESSAGE (OUTPATIENT)
Dept: OBSTETRICS AND GYNECOLOGY | Facility: CLINIC | Age: 31
End: 2021-02-18

## 2021-02-18 VITALS — DIASTOLIC BLOOD PRESSURE: 60 MMHG | WEIGHT: 145.5 LBS | SYSTOLIC BLOOD PRESSURE: 100 MMHG | BODY MASS INDEX: 25.77 KG/M2

## 2021-02-18 DIAGNOSIS — Z98.891 H/O: C-SECTION: ICD-10-CM

## 2021-02-18 DIAGNOSIS — Z3A.24 24 WEEKS GESTATION OF PREGNANCY: ICD-10-CM

## 2021-02-18 DIAGNOSIS — O09.92 HIGH-RISK PREGNANCY IN SECOND TRIMESTER: Primary | ICD-10-CM

## 2021-02-18 DIAGNOSIS — R39.89 POSSIBLE URINARY TRACT INFECTION: ICD-10-CM

## 2021-02-18 PROBLEM — O36.8390 FETAL HEART RATE/RHYTHM ABNORMALITY, ANTEPARTUM: Status: ACTIVE | Noted: 2021-02-18

## 2021-02-18 LAB
BILIRUB SERPL-MCNC: NORMAL MG/DL
BLOOD URINE, POC: NORMAL
CLARITY, POC UA: NORMAL
COLOR, POC UA: NORMAL
GLUCOSE SERPL-MCNC: 95 MG/DL (ref 70–140)
GLUCOSE UR QL STRIP: NORMAL
KETONES UR QL STRIP: NORMAL
LEUKOCYTE ESTERASE URINE, POC: NORMAL
NITRITE, POC UA: NORMAL
PH, POC UA: 7
PROTEIN, POC: NORMAL
SPECIFIC GRAVITY, POC UA: NORMAL
UROBILINOGEN, POC UA: NORMAL

## 2021-02-18 PROCEDURE — 99999 PR PBB SHADOW E&M-EST. PATIENT-LVL II: CPT | Mod: PBBFAC,,, | Performed by: OBSTETRICS & GYNECOLOGY

## 2021-02-18 PROCEDURE — 0502F SUBSEQUENT PRENATAL CARE: CPT | Mod: CPTII,S$GLB,, | Performed by: OBSTETRICS & GYNECOLOGY

## 2021-02-18 PROCEDURE — 82950 GLUCOSE TEST: CPT

## 2021-02-18 PROCEDURE — 36415 COLL VENOUS BLD VENIPUNCTURE: CPT | Mod: PO

## 2021-02-18 PROCEDURE — 87086 URINE CULTURE/COLONY COUNT: CPT

## 2021-02-18 PROCEDURE — 0502F PR SUBSEQUENT PRENATAL CARE: ICD-10-PCS | Mod: CPTII,S$GLB,, | Performed by: OBSTETRICS & GYNECOLOGY

## 2021-02-18 PROCEDURE — 81002 POCT URINE DIPSTICK WITHOUT MICROSCOPE: ICD-10-PCS | Mod: S$GLB,,, | Performed by: OBSTETRICS & GYNECOLOGY

## 2021-02-18 PROCEDURE — 81002 URINALYSIS NONAUTO W/O SCOPE: CPT | Mod: S$GLB,,, | Performed by: OBSTETRICS & GYNECOLOGY

## 2021-02-18 PROCEDURE — 99999 PR PBB SHADOW E&M-EST. PATIENT-LVL II: ICD-10-PCS | Mod: PBBFAC,,, | Performed by: OBSTETRICS & GYNECOLOGY

## 2021-02-18 RX ORDER — NITROFURANTOIN 25; 75 MG/1; MG/1
100 CAPSULE ORAL 2 TIMES DAILY
Qty: 14 CAPSULE | Refills: 0 | Status: SHIPPED | OUTPATIENT
Start: 2021-02-18 | End: 2021-02-25

## 2021-02-19 LAB — BACTERIA UR CULT: NO GROWTH

## 2021-03-03 ENCOUNTER — ROUTINE PRENATAL (OUTPATIENT)
Dept: OBSTETRICS AND GYNECOLOGY | Facility: CLINIC | Age: 31
End: 2021-03-03
Payer: COMMERCIAL

## 2021-03-03 VITALS
BODY MASS INDEX: 25.97 KG/M2 | WEIGHT: 146.63 LBS | SYSTOLIC BLOOD PRESSURE: 110 MMHG | DIASTOLIC BLOOD PRESSURE: 68 MMHG

## 2021-03-03 DIAGNOSIS — Z98.891 H/O: C-SECTION: ICD-10-CM

## 2021-03-03 DIAGNOSIS — Z3A.26 26 WEEKS GESTATION OF PREGNANCY: ICD-10-CM

## 2021-03-03 DIAGNOSIS — O09.92 HIGH-RISK PREGNANCY IN SECOND TRIMESTER: Primary | ICD-10-CM

## 2021-03-03 LAB
BILIRUB SERPL-MCNC: NEGATIVE MG/DL
BLOOD URINE, POC: NEGATIVE
CLARITY, POC UA: NORMAL
COLOR, POC UA: NORMAL
GLUCOSE UR QL STRIP: NORMAL
KETONES UR QL STRIP: NEGATIVE
LEUKOCYTE ESTERASE URINE, POC: NEGATIVE
NITRITE, POC UA: NEGATIVE
PH, POC UA: 7
PROTEIN, POC: NEGATIVE
SPECIFIC GRAVITY, POC UA: NORMAL
UROBILINOGEN, POC UA: NORMAL

## 2021-03-03 PROCEDURE — 99999 PR PBB SHADOW E&M-EST. PATIENT-LVL III: ICD-10-PCS | Mod: PBBFAC,,, | Performed by: OBSTETRICS & GYNECOLOGY

## 2021-03-03 PROCEDURE — 99999 PR PBB SHADOW E&M-EST. PATIENT-LVL III: CPT | Mod: PBBFAC,,, | Performed by: OBSTETRICS & GYNECOLOGY

## 2021-03-03 PROCEDURE — 0502F PR SUBSEQUENT PRENATAL CARE: ICD-10-PCS | Mod: CPTII,S$GLB,, | Performed by: OBSTETRICS & GYNECOLOGY

## 2021-03-03 PROCEDURE — 0502F SUBSEQUENT PRENATAL CARE: CPT | Mod: CPTII,S$GLB,, | Performed by: OBSTETRICS & GYNECOLOGY

## 2021-03-17 ENCOUNTER — TELEPHONE (OUTPATIENT)
Dept: OBSTETRICS AND GYNECOLOGY | Facility: CLINIC | Age: 31
End: 2021-03-17

## 2021-03-17 ENCOUNTER — CLINICAL SUPPORT (OUTPATIENT)
Dept: OBSTETRICS AND GYNECOLOGY | Facility: CLINIC | Age: 31
End: 2021-03-17
Payer: COMMERCIAL

## 2021-03-17 DIAGNOSIS — R39.89 POSSIBLE URINARY TRACT INFECTION: Primary | ICD-10-CM

## 2021-03-17 PROCEDURE — 87086 URINE CULTURE/COLONY COUNT: CPT | Performed by: OBSTETRICS & GYNECOLOGY

## 2021-03-17 RX ORDER — ASPIRIN 81 MG/1
81 TABLET ORAL DAILY
Status: ON HOLD | COMMUNITY
End: 2021-05-28 | Stop reason: HOSPADM

## 2021-03-17 RX ORDER — NITROFURANTOIN 25; 75 MG/1; MG/1
100 CAPSULE ORAL 2 TIMES DAILY
Qty: 14 CAPSULE | Refills: 0 | Status: ON HOLD | OUTPATIENT
Start: 2021-03-17 | End: 2021-03-20 | Stop reason: HOSPADM

## 2021-03-18 PROBLEM — O21.9 VOMITING DURING PREGNANCY: Status: ACTIVE | Noted: 2021-03-18

## 2021-03-19 PROBLEM — O23.03 PYELONEPHRITIS AFFECTING PREGNANCY IN THIRD TRIMESTER: Status: ACTIVE | Noted: 2021-03-19

## 2021-03-19 LAB
BACTERIA UR CULT: NORMAL
BACTERIA UR CULT: NORMAL

## 2021-03-22 ENCOUNTER — PATIENT MESSAGE (OUTPATIENT)
Dept: OBSTETRICS AND GYNECOLOGY | Facility: CLINIC | Age: 31
End: 2021-03-22

## 2021-03-24 ENCOUNTER — ROUTINE PRENATAL (OUTPATIENT)
Dept: OBSTETRICS AND GYNECOLOGY | Facility: CLINIC | Age: 31
End: 2021-03-24
Payer: COMMERCIAL

## 2021-03-24 VITALS
BODY MASS INDEX: 27.06 KG/M2 | DIASTOLIC BLOOD PRESSURE: 62 MMHG | WEIGHT: 152.75 LBS | SYSTOLIC BLOOD PRESSURE: 110 MMHG

## 2021-03-24 DIAGNOSIS — O09.299 HX OF PREECLAMPSIA, PRIOR PREGNANCY, CURRENTLY PREGNANT: ICD-10-CM

## 2021-03-24 DIAGNOSIS — O09.93 HIGH-RISK PREGNANCY IN THIRD TRIMESTER: Primary | ICD-10-CM

## 2021-03-24 DIAGNOSIS — Z98.891 H/O: C-SECTION: ICD-10-CM

## 2021-03-24 DIAGNOSIS — Z3A.29 29 WEEKS GESTATION OF PREGNANCY: ICD-10-CM

## 2021-03-24 LAB
BILIRUB SERPL-MCNC: NEGATIVE MG/DL
BLOOD URINE, POC: NEGATIVE
CLARITY, POC UA: NORMAL
COLOR, POC UA: NORMAL
GLUCOSE UR QL STRIP: NORMAL
KETONES UR QL STRIP: NEGATIVE
LEUKOCYTE ESTERASE URINE, POC: NEGATIVE
NITRITE, POC UA: NEGATIVE
PH, POC UA: 8
PROTEIN, POC: NEGATIVE
SPECIFIC GRAVITY, POC UA: NORMAL
UROBILINOGEN, POC UA: NORMAL

## 2021-03-24 PROCEDURE — 0502F SUBSEQUENT PRENATAL CARE: CPT | Mod: CPTII,S$GLB,, | Performed by: OBSTETRICS & GYNECOLOGY

## 2021-03-24 PROCEDURE — 0502F PR SUBSEQUENT PRENATAL CARE: ICD-10-PCS | Mod: CPTII,S$GLB,, | Performed by: OBSTETRICS & GYNECOLOGY

## 2021-03-24 PROCEDURE — 99999 PR PBB SHADOW E&M-EST. PATIENT-LVL III: CPT | Mod: PBBFAC,,, | Performed by: OBSTETRICS & GYNECOLOGY

## 2021-03-24 PROCEDURE — 99999 PR PBB SHADOW E&M-EST. PATIENT-LVL III: ICD-10-PCS | Mod: PBBFAC,,, | Performed by: OBSTETRICS & GYNECOLOGY

## 2021-04-08 ENCOUNTER — ROUTINE PRENATAL (OUTPATIENT)
Dept: OBSTETRICS AND GYNECOLOGY | Facility: CLINIC | Age: 31
End: 2021-04-08
Payer: COMMERCIAL

## 2021-04-08 VITALS
DIASTOLIC BLOOD PRESSURE: 86 MMHG | WEIGHT: 152.31 LBS | SYSTOLIC BLOOD PRESSURE: 124 MMHG | BODY MASS INDEX: 26.99 KG/M2

## 2021-04-08 DIAGNOSIS — O09.93 HIGH-RISK PREGNANCY IN THIRD TRIMESTER: Primary | ICD-10-CM

## 2021-04-08 DIAGNOSIS — Z98.891 H/O: C-SECTION: ICD-10-CM

## 2021-04-08 DIAGNOSIS — O09.299 HX OF PREECLAMPSIA, PRIOR PREGNANCY, CURRENTLY PREGNANT: ICD-10-CM

## 2021-04-08 DIAGNOSIS — Z3A.31 31 WEEKS GESTATION OF PREGNANCY: ICD-10-CM

## 2021-04-08 LAB
BILIRUB SERPL-MCNC: NEGATIVE MG/DL
BLOOD URINE, POC: NEGATIVE
CLARITY, POC UA: NORMAL
COLOR, POC UA: NORMAL
GLUCOSE UR QL STRIP: NORMAL
KETONES UR QL STRIP: NEGATIVE
LEUKOCYTE ESTERASE URINE, POC: NEGATIVE
NITRITE, POC UA: NEGATIVE
PH, POC UA: 7
PROTEIN, POC: NORMAL
SPECIFIC GRAVITY, POC UA: NORMAL
UROBILINOGEN, POC UA: NORMAL

## 2021-04-08 PROCEDURE — 99999 PR PBB SHADOW E&M-EST. PATIENT-LVL III: CPT | Mod: PBBFAC,,, | Performed by: OBSTETRICS & GYNECOLOGY

## 2021-04-08 PROCEDURE — 0502F SUBSEQUENT PRENATAL CARE: CPT | Mod: CPTII,S$GLB,, | Performed by: OBSTETRICS & GYNECOLOGY

## 2021-04-08 PROCEDURE — 99999 PR PBB SHADOW E&M-EST. PATIENT-LVL III: ICD-10-PCS | Mod: PBBFAC,,, | Performed by: OBSTETRICS & GYNECOLOGY

## 2021-04-08 PROCEDURE — 0502F PR SUBSEQUENT PRENATAL CARE: ICD-10-PCS | Mod: CPTII,S$GLB,, | Performed by: OBSTETRICS & GYNECOLOGY

## 2021-04-14 ENCOUNTER — PATIENT MESSAGE (OUTPATIENT)
Dept: ADMINISTRATIVE | Facility: OTHER | Age: 31
End: 2021-04-14

## 2021-04-28 ENCOUNTER — ROUTINE PRENATAL (OUTPATIENT)
Dept: OBSTETRICS AND GYNECOLOGY | Facility: CLINIC | Age: 31
End: 2021-04-28
Payer: COMMERCIAL

## 2021-04-28 VITALS
BODY MASS INDEX: 27.22 KG/M2 | SYSTOLIC BLOOD PRESSURE: 110 MMHG | DIASTOLIC BLOOD PRESSURE: 74 MMHG | WEIGHT: 153.69 LBS

## 2021-04-28 DIAGNOSIS — Z98.891 H/O: C-SECTION: ICD-10-CM

## 2021-04-28 DIAGNOSIS — O09.93 HIGH-RISK PREGNANCY IN THIRD TRIMESTER: Primary | ICD-10-CM

## 2021-04-28 DIAGNOSIS — Z3A.34 34 WEEKS GESTATION OF PREGNANCY: ICD-10-CM

## 2021-04-28 PROCEDURE — 99999 PR PBB SHADOW E&M-EST. PATIENT-LVL II: ICD-10-PCS | Mod: PBBFAC,,, | Performed by: OBSTETRICS & GYNECOLOGY

## 2021-04-28 PROCEDURE — 0502F PR SUBSEQUENT PRENATAL CARE: ICD-10-PCS | Mod: CPTII,S$GLB,, | Performed by: OBSTETRICS & GYNECOLOGY

## 2021-04-28 PROCEDURE — 0502F SUBSEQUENT PRENATAL CARE: CPT | Mod: CPTII,S$GLB,, | Performed by: OBSTETRICS & GYNECOLOGY

## 2021-04-28 PROCEDURE — 99999 PR PBB SHADOW E&M-EST. PATIENT-LVL II: CPT | Mod: PBBFAC,,, | Performed by: OBSTETRICS & GYNECOLOGY

## 2021-05-03 ENCOUNTER — PATIENT MESSAGE (OUTPATIENT)
Dept: OBSTETRICS AND GYNECOLOGY | Facility: CLINIC | Age: 31
End: 2021-05-03

## 2021-05-14 ENCOUNTER — HOSPITAL ENCOUNTER (OUTPATIENT)
Dept: RADIOLOGY | Facility: HOSPITAL | Age: 31
Discharge: HOME | End: 2021-05-14
Attending: OBSTETRICS & GYNECOLOGY
Payer: COMMERCIAL

## 2021-05-14 ENCOUNTER — ROUTINE PRENATAL (OUTPATIENT)
Dept: OBSTETRICS AND GYNECOLOGY | Facility: CLINIC | Age: 31
End: 2021-05-14
Payer: COMMERCIAL

## 2021-05-14 VITALS
DIASTOLIC BLOOD PRESSURE: 74 MMHG | SYSTOLIC BLOOD PRESSURE: 112 MMHG | BODY MASS INDEX: 27.69 KG/M2 | WEIGHT: 156.31 LBS

## 2021-05-14 DIAGNOSIS — Z98.891 H/O: C-SECTION: ICD-10-CM

## 2021-05-14 DIAGNOSIS — O09.93 HIGH-RISK PREGNANCY IN THIRD TRIMESTER: ICD-10-CM

## 2021-05-14 DIAGNOSIS — O09.299 HX OF PREECLAMPSIA, PRIOR PREGNANCY, CURRENTLY PREGNANT: ICD-10-CM

## 2021-05-14 DIAGNOSIS — O09.93 HIGH-RISK PREGNANCY IN THIRD TRIMESTER: Primary | ICD-10-CM

## 2021-05-14 DIAGNOSIS — Z3A.36 36 WEEKS GESTATION OF PREGNANCY: ICD-10-CM

## 2021-05-14 LAB
BILIRUB SERPL-MCNC: NORMAL MG/DL
BLOOD URINE, POC: NORMAL
CLARITY, POC UA: NORMAL
COLOR, POC UA: NORMAL
GLUCOSE UR QL STRIP: NORMAL
KETONES UR QL STRIP: NORMAL
LEUKOCYTE ESTERASE URINE, POC: NORMAL
NITRITE, POC UA: NORMAL
PH, POC UA: 6
PROTEIN, POC: NORMAL
SPECIFIC GRAVITY, POC UA: NORMAL
UROBILINOGEN, POC UA: NORMAL

## 2021-05-14 PROCEDURE — 87081 CULTURE SCREEN ONLY: CPT | Performed by: OBSTETRICS & GYNECOLOGY

## 2021-05-14 PROCEDURE — 0502F PR SUBSEQUENT PRENATAL CARE: ICD-10-PCS | Mod: CPTII,S$GLB,, | Performed by: OBSTETRICS & GYNECOLOGY

## 2021-05-14 PROCEDURE — 0502F SUBSEQUENT PRENATAL CARE: CPT | Mod: CPTII,S$GLB,, | Performed by: OBSTETRICS & GYNECOLOGY

## 2021-05-14 PROCEDURE — 99999 PR PBB SHADOW E&M-EST. PATIENT-LVL II: CPT | Mod: PBBFAC,,, | Performed by: OBSTETRICS & GYNECOLOGY

## 2021-05-14 PROCEDURE — 76816 OB US FOLLOW-UP PER FETUS: CPT | Mod: TC,PN

## 2021-05-14 PROCEDURE — 76816 OB US FOLLOW-UP PER FETUS: CPT | Mod: 26,,, | Performed by: RADIOLOGY

## 2021-05-14 PROCEDURE — 99999 PR PBB SHADOW E&M-EST. PATIENT-LVL II: ICD-10-PCS | Mod: PBBFAC,,, | Performed by: OBSTETRICS & GYNECOLOGY

## 2021-05-14 PROCEDURE — 76816 US OB FOLLOW UP EA GESTATION TRANSABDOMINAL: ICD-10-PCS | Mod: 26,,, | Performed by: RADIOLOGY

## 2021-05-14 RX ORDER — VALACYCLOVIR HYDROCHLORIDE 500 MG/1
500 TABLET, FILM COATED ORAL 2 TIMES DAILY
Qty: 60 TABLET | Refills: 1 | Status: SHIPPED | OUTPATIENT
Start: 2021-05-14 | End: 2022-08-22

## 2021-05-17 LAB — BACTERIA SPEC AEROBE CULT: NORMAL

## 2021-05-19 ENCOUNTER — ROUTINE PRENATAL (OUTPATIENT)
Dept: OBSTETRICS AND GYNECOLOGY | Facility: CLINIC | Age: 31
End: 2021-05-19
Payer: COMMERCIAL

## 2021-05-19 VITALS
DIASTOLIC BLOOD PRESSURE: 60 MMHG | BODY MASS INDEX: 27.77 KG/M2 | WEIGHT: 156.75 LBS | SYSTOLIC BLOOD PRESSURE: 124 MMHG

## 2021-05-19 DIAGNOSIS — Z3A.37 37 WEEKS GESTATION OF PREGNANCY: ICD-10-CM

## 2021-05-19 DIAGNOSIS — Z98.891 H/O: C-SECTION: ICD-10-CM

## 2021-05-19 DIAGNOSIS — O09.93 HIGH-RISK PREGNANCY IN THIRD TRIMESTER: Primary | ICD-10-CM

## 2021-05-19 DIAGNOSIS — O09.299 HX OF PREECLAMPSIA, PRIOR PREGNANCY, CURRENTLY PREGNANT: ICD-10-CM

## 2021-05-19 PROCEDURE — 99999 PR PBB SHADOW E&M-EST. PATIENT-LVL II: CPT | Mod: PBBFAC,,, | Performed by: OBSTETRICS & GYNECOLOGY

## 2021-05-19 PROCEDURE — 0502F PR SUBSEQUENT PRENATAL CARE: ICD-10-PCS | Mod: CPTII,S$GLB,, | Performed by: OBSTETRICS & GYNECOLOGY

## 2021-05-19 PROCEDURE — 0502F SUBSEQUENT PRENATAL CARE: CPT | Mod: CPTII,S$GLB,, | Performed by: OBSTETRICS & GYNECOLOGY

## 2021-05-19 PROCEDURE — 99999 PR PBB SHADOW E&M-EST. PATIENT-LVL II: ICD-10-PCS | Mod: PBBFAC,,, | Performed by: OBSTETRICS & GYNECOLOGY

## 2021-05-26 ENCOUNTER — ROUTINE PRENATAL (OUTPATIENT)
Dept: OBSTETRICS AND GYNECOLOGY | Facility: CLINIC | Age: 31
End: 2021-05-26
Payer: COMMERCIAL

## 2021-05-26 VITALS
BODY MASS INDEX: 28.04 KG/M2 | WEIGHT: 158.31 LBS | DIASTOLIC BLOOD PRESSURE: 80 MMHG | SYSTOLIC BLOOD PRESSURE: 128 MMHG

## 2021-05-26 DIAGNOSIS — Z3A.38 38 WEEKS GESTATION OF PREGNANCY: Primary | ICD-10-CM

## 2021-05-26 PROBLEM — O23.03 PYELONEPHRITIS AFFECTING PREGNANCY IN THIRD TRIMESTER: Status: RESOLVED | Noted: 2021-03-19 | Resolved: 2021-05-26

## 2021-05-26 PROBLEM — O21.9 VOMITING DURING PREGNANCY: Status: RESOLVED | Noted: 2021-03-18 | Resolved: 2021-05-26

## 2021-05-26 PROBLEM — O34.211 MATERNAL CARE DUE TO LOW TRANSVERSE UTERINE SCAR FROM PREVIOUS CESAREAN DELIVERY: Status: ACTIVE | Noted: 2021-05-26

## 2021-05-26 LAB
BILIRUB SERPL-MCNC: NEGATIVE MG/DL
BLOOD URINE, POC: NEGATIVE
CLARITY, POC UA: NORMAL
COLOR, POC UA: NORMAL
GLUCOSE UR QL STRIP: NORMAL
KETONES UR QL STRIP: NEGATIVE
LEUKOCYTE ESTERASE URINE, POC: NEGATIVE
NITRITE, POC UA: NEGATIVE
PH, POC UA: 6
PROTEIN, POC: NORMAL
SPECIFIC GRAVITY, POC UA: NORMAL
UROBILINOGEN, POC UA: NORMAL

## 2021-05-26 PROCEDURE — 99999 PR PBB SHADOW E&M-EST. PATIENT-LVL III: CPT | Mod: PBBFAC,,, | Performed by: OBSTETRICS & GYNECOLOGY

## 2021-05-26 PROCEDURE — 0502F PR SUBSEQUENT PRENATAL CARE: ICD-10-PCS | Mod: CPTII,S$GLB,, | Performed by: OBSTETRICS & GYNECOLOGY

## 2021-05-26 PROCEDURE — 99999 PR PBB SHADOW E&M-EST. PATIENT-LVL III: ICD-10-PCS | Mod: PBBFAC,,, | Performed by: OBSTETRICS & GYNECOLOGY

## 2021-05-26 PROCEDURE — 0502F SUBSEQUENT PRENATAL CARE: CPT | Mod: CPTII,S$GLB,, | Performed by: OBSTETRICS & GYNECOLOGY

## 2021-05-26 RX ORDER — HYDROXYZINE PAMOATE 25 MG/1
CAPSULE ORAL
Status: ON HOLD | COMMUNITY
Start: 2021-03-25 | End: 2021-05-28 | Stop reason: HOSPADM

## 2021-06-04 ENCOUNTER — PATIENT MESSAGE (OUTPATIENT)
Dept: OBSTETRICS AND GYNECOLOGY | Facility: CLINIC | Age: 31
End: 2021-06-04

## 2021-06-14 ENCOUNTER — POSTPARTUM VISIT (OUTPATIENT)
Dept: OBSTETRICS AND GYNECOLOGY | Facility: CLINIC | Age: 31
End: 2021-06-14
Payer: COMMERCIAL

## 2021-06-14 VITALS
BODY MASS INDEX: 24.61 KG/M2 | HEIGHT: 63 IN | WEIGHT: 138.88 LBS | DIASTOLIC BLOOD PRESSURE: 72 MMHG | SYSTOLIC BLOOD PRESSURE: 112 MMHG

## 2021-06-14 PROCEDURE — 99999 PR PBB SHADOW E&M-EST. PATIENT-LVL III: CPT | Mod: PBBFAC,,, | Performed by: OBSTETRICS & GYNECOLOGY

## 2021-06-14 PROCEDURE — 0503F PR POSTPARTUM CARE VISIT: ICD-10-PCS | Mod: S$GLB,,, | Performed by: OBSTETRICS & GYNECOLOGY

## 2021-06-14 PROCEDURE — 0503F POSTPARTUM CARE VISIT: CPT | Mod: S$GLB,,, | Performed by: OBSTETRICS & GYNECOLOGY

## 2021-06-14 PROCEDURE — 99999 PR PBB SHADOW E&M-EST. PATIENT-LVL III: ICD-10-PCS | Mod: PBBFAC,,, | Performed by: OBSTETRICS & GYNECOLOGY

## 2021-12-17 ENCOUNTER — PATIENT MESSAGE (OUTPATIENT)
Dept: OBSTETRICS AND GYNECOLOGY | Facility: CLINIC | Age: 31
End: 2021-12-17
Payer: COMMERCIAL

## 2021-12-17 RX ORDER — FLUCONAZOLE 150 MG/1
150 TABLET ORAL ONCE
Qty: 1 TABLET | Refills: 1 | Status: SHIPPED | OUTPATIENT
Start: 2021-12-17 | End: 2021-12-17

## 2022-10-04 ENCOUNTER — PATIENT MESSAGE (OUTPATIENT)
Dept: OBSTETRICS AND GYNECOLOGY | Facility: CLINIC | Age: 32
End: 2022-10-04
Payer: COMMERCIAL

## 2022-12-27 ENCOUNTER — PATIENT MESSAGE (OUTPATIENT)
Dept: OBSTETRICS AND GYNECOLOGY | Facility: CLINIC | Age: 32
End: 2022-12-27
Payer: COMMERCIAL

## 2022-12-27 RX ORDER — FLUCONAZOLE 150 MG/1
150 TABLET ORAL ONCE
Qty: 1 TABLET | Refills: 0 | Status: SHIPPED | OUTPATIENT
Start: 2022-12-27 | End: 2022-12-27

## 2025-01-13 ENCOUNTER — TELEPHONE (OUTPATIENT)
Dept: OBSTETRICS AND GYNECOLOGY | Facility: CLINIC | Age: 35
End: 2025-01-13
Payer: COMMERCIAL

## 2025-02-18 ENCOUNTER — OFFICE VISIT (OUTPATIENT)
Dept: OBSTETRICS AND GYNECOLOGY | Facility: CLINIC | Age: 35
End: 2025-02-18
Payer: COMMERCIAL

## 2025-02-18 ENCOUNTER — LAB VISIT (OUTPATIENT)
Dept: LAB | Facility: HOSPITAL | Age: 35
End: 2025-02-18
Attending: OBSTETRICS & GYNECOLOGY
Payer: COMMERCIAL

## 2025-02-18 VITALS
WEIGHT: 158.75 LBS | HEIGHT: 63 IN | SYSTOLIC BLOOD PRESSURE: 104 MMHG | DIASTOLIC BLOOD PRESSURE: 66 MMHG | BODY MASS INDEX: 28.13 KG/M2

## 2025-02-18 DIAGNOSIS — N92.0 MENORRHAGIA WITH REGULAR CYCLE: ICD-10-CM

## 2025-02-18 DIAGNOSIS — Z01.419 ROUTINE GYNECOLOGICAL EXAMINATION: Primary | ICD-10-CM

## 2025-02-18 DIAGNOSIS — Z98.891 S/P CESAREAN SECTION: ICD-10-CM

## 2025-02-18 LAB
ALBUMIN SERPL BCP-MCNC: 4.2 G/DL (ref 3.5–5.2)
ALP SERPL-CCNC: 63 U/L (ref 40–150)
ALT SERPL W/O P-5'-P-CCNC: 13 U/L (ref 10–44)
ANION GAP SERPL CALC-SCNC: 11 MMOL/L (ref 8–16)
AST SERPL-CCNC: 34 U/L (ref 10–40)
BASOPHILS # BLD AUTO: 0.05 K/UL (ref 0–0.2)
BASOPHILS NFR BLD: 0.7 % (ref 0–1.9)
BILIRUB SERPL-MCNC: 0.3 MG/DL (ref 0.1–1)
BUN SERPL-MCNC: 9 MG/DL (ref 6–20)
CALCIUM SERPL-MCNC: 9.4 MG/DL (ref 8.7–10.5)
CHLORIDE SERPL-SCNC: 106 MMOL/L (ref 95–110)
CO2 SERPL-SCNC: 21 MMOL/L (ref 23–29)
CREAT SERPL-MCNC: 0.7 MG/DL (ref 0.5–1.4)
DIFFERENTIAL METHOD BLD: ABNORMAL
EOSINOPHIL # BLD AUTO: 0.1 K/UL (ref 0–0.5)
EOSINOPHIL NFR BLD: 0.7 % (ref 0–8)
ERYTHROCYTE [DISTWIDTH] IN BLOOD BY AUTOMATED COUNT: 17.2 % (ref 11.5–14.5)
EST. GFR  (NO RACE VARIABLE): >60 ML/MIN/1.73 M^2
GLUCOSE SERPL-MCNC: 88 MG/DL (ref 70–110)
HCT VFR BLD AUTO: 33.1 % (ref 37–48.5)
HGB BLD-MCNC: 9.7 G/DL (ref 12–16)
IMM GRANULOCYTES # BLD AUTO: 0.02 K/UL (ref 0–0.04)
IMM GRANULOCYTES NFR BLD AUTO: 0.3 % (ref 0–0.5)
LYMPHOCYTES # BLD AUTO: 2.4 K/UL (ref 1–4.8)
LYMPHOCYTES NFR BLD: 32.2 % (ref 18–48)
MCH RBC QN AUTO: 22.8 PG (ref 27–31)
MCHC RBC AUTO-ENTMCNC: 29.3 G/DL (ref 32–36)
MCV RBC AUTO: 78 FL (ref 82–98)
MONOCYTES # BLD AUTO: 0.6 K/UL (ref 0.3–1)
MONOCYTES NFR BLD: 7.8 % (ref 4–15)
NEUTROPHILS # BLD AUTO: 4.4 K/UL (ref 1.8–7.7)
NEUTROPHILS NFR BLD: 58.3 % (ref 38–73)
NRBC BLD-RTO: 0 /100 WBC
PLATELET # BLD AUTO: 258 K/UL (ref 150–450)
PMV BLD AUTO: 12 FL (ref 9.2–12.9)
POTASSIUM SERPL-SCNC: 4 MMOL/L (ref 3.5–5.1)
PROT SERPL-MCNC: 7.5 G/DL (ref 6–8.4)
RBC # BLD AUTO: 4.26 M/UL (ref 4–5.4)
SODIUM SERPL-SCNC: 138 MMOL/L (ref 136–145)
TSH SERPL DL<=0.005 MIU/L-ACNC: 1.93 UIU/ML (ref 0.4–4)
WBC # BLD AUTO: 7.55 K/UL (ref 3.9–12.7)

## 2025-02-18 PROCEDURE — 36415 COLL VENOUS BLD VENIPUNCTURE: CPT | Mod: PN | Performed by: OBSTETRICS & GYNECOLOGY

## 2025-02-18 PROCEDURE — 80053 COMPREHEN METABOLIC PANEL: CPT | Performed by: OBSTETRICS & GYNECOLOGY

## 2025-02-18 PROCEDURE — 84443 ASSAY THYROID STIM HORMONE: CPT | Performed by: OBSTETRICS & GYNECOLOGY

## 2025-02-18 PROCEDURE — 85025 COMPLETE CBC W/AUTO DIFF WBC: CPT | Performed by: OBSTETRICS & GYNECOLOGY

## 2025-02-18 RX ORDER — NORETHINDRONE ACETATE AND ETHINYL ESTRADIOL 1MG-20(21)
1 KIT ORAL DAILY
Qty: 90 TABLET | Refills: 3 | Status: SHIPPED | OUTPATIENT
Start: 2025-02-18 | End: 2026-02-18

## 2025-02-18 NOTE — PROGRESS NOTES
Chief Complaint   Patient presents with    Well Woman       History of Present Illness: Maggy Koenig is a 34 y.o. female that presents today 2025 with Patient's last menstrual period was 2025.  for well gyn visit.  She report pad and tampon together every 30 minutes with clots.         Past Medical History:   Diagnosis Date    Abnormal Pap smear of vagina     colpo    HSV infection     Mild preeclampsia     At 37 weeks w/ 1st preg       Past Surgical History:   Procedure Laterality Date     SECTION       SECTION N/A 2021    Procedure:  SECTION;  Surgeon: Clementine Krause MD;  Location: [facility] L&D;  Service: OB/GYN;  Laterality: N/A;    COLPOSCOPY         Medications Prior to Visit[1]    Review of patient's allergies indicates:   Allergen Reactions    Ciprofloxacin Hives, Itching and Rash       Family History   Problem Relation Name Age of Onset    Breast cancer Neg Hx      Ovarian cancer Neg Hx         Social History     Socioeconomic History    Marital status:    Tobacco Use    Smoking status: Never    Smokeless tobacco: Never   Substance and Sexual Activity    Alcohol use: No    Drug use: No    Sexual activity: Yes     Partners: Male     Birth control/protection: None       OB History    Para Term  AB Living   4 4 4   4   SAB IAB Ectopic Multiple Live Births      0 4      # Outcome Date GA Lbr Krishna/2nd Weight Sex Type Anes PTL Lv   4 Term 21 38w0d  3.033 kg (6 lb 11 oz) M CS-LTranv Spinal  KADI   3 Term 07/10/17 39w1d  3.204 kg (7 lb 1 oz) M CS-LTranv Spinal N KADI   2 Term 13 39w0d  2.977 kg (6 lb 9 oz) F CS-LTranv Spinal N KADI      Birth Comments: System Generated. Please review and update pregnancy details.   1 Term 11 37w0d  2.58 kg (5 lb 11 oz) F CS-LTranv Spinal  KADI      Birth Comments: pre-eclampsia and delivered at 37 weeks via        Review of Symptoms:  GENERAL: Denies weight gain or weight loss. Feeling  "well overall.   SKIN: Denies rash or lesions.   HEAD: Denies head injury or headache.   NODES: Denies enlarged lymph nodes.   CHEST: Denies chest pain or shortness of breath.   CARDIOVASCULAR: Denies palpitations or left sided chest pain.   ABDOMEN: No abdominal pain, constipation, diarrhea, nausea, vomiting or rectal bleeding.   URINARY: No frequency, dysuria, hematuria, or burning on urination.  HEMATOLOGIC: No easy bruisability or excessive bleeding.   MUSCULOSKELETAL: Denies joint pain or swelling.     /66   Ht 5' 3" (1.6 m)   Wt 72 kg (158 lb 11.7 oz)   LMP 2025   Physical Exam:  APPEARANCE: Well nourished, well developed, in no acute distress.  SKIN: Normal skin turgor, no lesions.  NECK: Neck symmetric without masses   RESPIRATORY: Normal respiratory effort with no retractions or use of accessory muscles  CARDIOVASCULAR: Peripheral vascular system with no swelling no varicosities and palpation of pulses normal  LYMPHATIC: No enlargements of the lymph nodes noted in the neck, axillae, or groin  ABDOMEN: Soft. No tenderness or masses. No hepatosplenomegaly. No hernias.  BREASTS: Symmetrical, no skin changes or visible lesions. No palpable masses, nipple discharge or adenopathy bilaterally.  PELVIC: Normal external female genitalia without lesions. Normal hair distribution. Adequate perineal body, normal urethral meatus. Urethra with no masses.  Bladder nontender. Vagina moist and well rugated without lesions or discharge. Cervix pink and without lesions. No significant cystocele or rectocele. Bimanual exam showed uterus normal size, shape, position, mobile and nontender. Adnexa without masses or tenderness. Urethra and bladder normal.   EXTREMITIES: No clubbing cyanosis or edema.    ASSESSMENT/PLAN:  Routine gynecological examination  -     Liquid-Based Pap Smear, Screening  -     HPV High Risk Genotypes, PCR    S/P  section    Menorrhagia with regular cycle  -     CBC W/ AUTO " DIFFERENTIAL; Future; Expected date: 02/18/2025  -     TSH; Future; Expected date: 02/18/2025  -     Comprehensive Metabolic Panel; Future; Expected date: 02/18/2025  -     US Pelvis Comp with Transvag NON-OB (xpd; Future; Expected date: 02/18/2025  -     norethindrone-ethinyl estradiol (JUNEL FE 1/20) 1 mg-20 mcg (21)/75 mg (7) per tablet; Take 1 tablet by mouth once daily.  Dispense: 90 tablet; Refill: 3          Patient was counseled today on Pelvic exams and Pap Smear guidelines.   We discussed STD screening if at high risk for a STD.  We discussed recommendation for breast cancer screening with mammogram every other year after the age of 40 and annually after the age of 50.    We discussed colon cancer screening when indicated.   Osteoporosis screening discussed when indicated.   She was advised to see her primary care physician for all other health maintenance.     FOLLOW-UP with me for next routine visit.          [1]   Outpatient Medications Prior to Visit   Medication Sig Dispense Refill    ibuprofen (ADVIL,MOTRIN) 600 MG tablet Take 1 tablet (600 mg total) by mouth every 6 (six) hours. 30 tablet 0    oxyCODONE-acetaminophen (PERCOCET) 5-325 mg per tablet Take 1 tablet by mouth every 4 (four) hours as needed. 15 tablet 0    PNV #22-iron ps,[id number]-[id number]-dha 28 mg iron-6 mg iron-1 mg Cmpk Take 1 tablet by mouth once daily.       valACYclovir (VALTREX) 500 MG tablet TAKE 1 TABLET(500 MG) BY MOUTH TWICE DAILY 60 tablet 1     No facility-administered medications prior to visit.

## 2025-02-19 ENCOUNTER — RESULTS FOLLOW-UP (OUTPATIENT)
Dept: OBSTETRICS AND GYNECOLOGY | Facility: CLINIC | Age: 35
End: 2025-02-19

## 2025-03-03 ENCOUNTER — HOSPITAL ENCOUNTER (OUTPATIENT)
Dept: RADIOLOGY | Facility: HOSPITAL | Age: 35
Discharge: HOME | End: 2025-03-03
Attending: OBSTETRICS & GYNECOLOGY
Payer: COMMERCIAL

## 2025-03-03 DIAGNOSIS — N92.0 MENORRHAGIA WITH REGULAR CYCLE: ICD-10-CM

## 2025-03-03 PROCEDURE — 76856 US EXAM PELVIC COMPLETE: CPT | Mod: TC,PN

## 2025-03-03 PROCEDURE — 76830 TRANSVAGINAL US NON-OB: CPT | Mod: 26,,, | Performed by: RADIOLOGY

## 2025-03-03 PROCEDURE — 76856 US EXAM PELVIC COMPLETE: CPT | Mod: 26,,, | Performed by: RADIOLOGY

## 2025-03-13 ENCOUNTER — OFFICE VISIT (OUTPATIENT)
Dept: OBSTETRICS AND GYNECOLOGY | Facility: CLINIC | Age: 35
End: 2025-03-13
Payer: COMMERCIAL

## 2025-03-13 VITALS
BODY MASS INDEX: 28.24 KG/M2 | WEIGHT: 159.38 LBS | SYSTOLIC BLOOD PRESSURE: 114 MMHG | DIASTOLIC BLOOD PRESSURE: 74 MMHG

## 2025-03-13 DIAGNOSIS — D50.0 IRON DEFICIENCY ANEMIA DUE TO CHRONIC BLOOD LOSS: ICD-10-CM

## 2025-03-13 DIAGNOSIS — Z98.891 H/O CESAREAN SECTION: ICD-10-CM

## 2025-03-13 DIAGNOSIS — N92.0 MENORRHAGIA WITH REGULAR CYCLE: Primary | ICD-10-CM

## 2025-03-13 PROCEDURE — 99999 PR PBB SHADOW E&M-EST. PATIENT-LVL III: CPT | Mod: PBBFAC,,, | Performed by: OBSTETRICS & GYNECOLOGY

## 2025-03-13 PROCEDURE — 3008F BODY MASS INDEX DOCD: CPT | Mod: CPTII,S$GLB,, | Performed by: OBSTETRICS & GYNECOLOGY

## 2025-03-13 PROCEDURE — 58100 BIOPSY OF UTERUS LINING: CPT | Mod: S$GLB,,, | Performed by: OBSTETRICS & GYNECOLOGY

## 2025-03-13 PROCEDURE — 99213 OFFICE O/P EST LOW 20 MIN: CPT | Mod: 25,S$GLB,, | Performed by: OBSTETRICS & GYNECOLOGY

## 2025-03-13 PROCEDURE — 3078F DIAST BP <80 MM HG: CPT | Mod: CPTII,S$GLB,, | Performed by: OBSTETRICS & GYNECOLOGY

## 2025-03-13 PROCEDURE — 1159F MED LIST DOCD IN RCRD: CPT | Mod: CPTII,S$GLB,, | Performed by: OBSTETRICS & GYNECOLOGY

## 2025-03-13 PROCEDURE — 3074F SYST BP LT 130 MM HG: CPT | Mod: CPTII,S$GLB,, | Performed by: OBSTETRICS & GYNECOLOGY

## 2025-03-13 NOTE — PROGRESS NOTES
ENDOMETRIAL BIOPSY:    Female patient presents for an endometrial biopsy due to abnormal bleeding.      UPT is negative.    PRE ENDOMETRIAL BIOPSY COUNSELING:  The patient was informed of the risk of bleeding, infection, uterine perforation and pain and that the test will rule-out endometrial cancer with accuracy greater than 95%. She was counseled on the alternatives to endometrial biopsy and agrees to proceed.    PROCEDURE:  TIME OUT PERFORMED.  The cervix was visualized with a speculum.  A single tooth tenaculum was placed on the anterior lip prior to the biopsy.  A sterile endometrial pipelle was passed without difficulty to a depth of 10 cm.  Adequate endometrial tissue was obtained.    The specimen was placed in formalyn and sent to Pathology for histology evaluation.  The patient tolerated the procedure well.    ASSESSMENT:   Abnormal uterine bleeding  626.8.    POST ENDOMETRIAL BIOPSY COUNSELING:  Manage post biopsy cramping with NSAIDs or Tylenol.  Expect spotting or light bleeding for a few days.  Report bleeding heavier than a period, fever > 101.0 F, worsening pain or a foul smelling vaginal discharge.    Counseling lasted approximately 15 minutes and all her questions were answered.    FOLLOW-UP: Pending biopsy results.    Chief Complaint   Patient presents with    Endometrial Biopsy       History of Present Illness: Maggy Koenig is a 34 y.o. female that presents today 3/13/2025 for   Chief Complaint   Patient presents with    Endometrial Biopsy         Past Medical History:   Diagnosis Date    Abnormal Pap smear of vagina     colpo    HSV infection     Mild preeclampsia     At 37 weeks w/ 1st preg       Past Surgical History:   Procedure Laterality Date     SECTION       SECTION N/A 2021    Procedure:  SECTION;  Surgeon: Clementine Krause MD;  Location: [facility];  Service: OB/GYN;  Laterality: N/A;    COLPOSCOPY         Medications Prior to Visit[1]    Review  of patient's allergies indicates:   Allergen Reactions    Ciprofloxacin Hives, Itching and Rash       Family History   Problem Relation Name Age of Onset    Breast cancer Neg Hx      Ovarian cancer Neg Hx      Uterine cancer Neg Hx      Cervical cancer Neg Hx      Diabetes Neg Hx         Social History[2]    OB History    Para Term  AB Living   4 4 4   4   SAB IAB Ectopic Multiple Live Births      0 4      # Outcome Date GA Lbr Krishna/2nd Weight Sex Type Anes PTL Lv   4 Term 21 38w0d  3.033 kg (6 lb 11 oz) M CS-LTranv Spinal  KADI   3 Term 07/10/17 39w1d  3.204 kg (7 lb 1 oz) M CS-LTranv Spinal N KADI   2 Term 13 39w0d  2.977 kg (6 lb 9 oz) F CS-LTranv Spinal N KADI      Birth Comments: System Generated. Please review and update pregnancy details.   1 Term 11 37w0d  2.58 kg (5 lb 11 oz) F CS-LTranv Spinal  KADI      Birth Comments: pre-eclampsia and delivered at 37 weeks via          /74 (Patient Position: Sitting)   Wt 72.3 kg (159 lb 6.3 oz)   LMP 2025 (Exact Date)       ASSESSMENT/PLAN:  Menorrhagia with regular cycle  Comments:  plans to try OCPS   EMB today    Iron deficiency anemia due to chronic blood loss  Comments:  tolerating iron  repeat labs in 4-6 weeks  Orders:  -     CBC W/ AUTO DIFFERENTIAL; Future; Expected date: 2025  -     Ferritin; Future; Expected date: 2025  -     IRON AND TIBC; Future; Expected date: 2025  -     Specimen to Pathology, Ob/Gyn    H/O  section X4  Comments:  would not consider ablation        We discussed hysterectomy, mirena, and OCPS    She will consider      20 minutes spent today preparing reviewing previous external notes, reviewing previous results, performing medical examination, orders tests or medications, counseling and documenting.            [1]   Outpatient Medications Prior to Visit   Medication Sig Dispense Refill    ibuprofen (ADVIL,MOTRIN) 600 MG tablet Take 1 tablet (600 mg total) by mouth  every 6 (six) hours. 30 tablet 0    norethindrone-ethinyl estradiol (JUNEL FE 1/20) 1 mg-20 mcg (21)/75 mg (7) per tablet Take 1 tablet by mouth once daily. 90 tablet 3    oxyCODONE-acetaminophen (PERCOCET) 5-325 mg per tablet Take 1 tablet by mouth every 4 (four) hours as needed. 15 tablet 0    PNV #22-iron ps,nd-YN-jb4-dha 28 mg iron-6 mg iron-1 mg Cmpk Take 1 tablet by mouth once daily.       valACYclovir (VALTREX) 500 MG tablet TAKE 1 TABLET(500 MG) BY MOUTH TWICE DAILY 60 tablet 1     No facility-administered medications prior to visit.   [2]   Social History  Tobacco Use    Smoking status: Never    Smokeless tobacco: Never   Substance Use Topics    Alcohol use: No    Drug use: No

## 2025-03-17 ENCOUNTER — RESULTS FOLLOW-UP (OUTPATIENT)
Dept: OBSTETRICS AND GYNECOLOGY | Facility: CLINIC | Age: 35
End: 2025-03-17

## 2025-04-01 ENCOUNTER — LAB VISIT (OUTPATIENT)
Dept: LAB | Facility: HOSPITAL | Age: 35
End: 2025-04-01
Attending: OBSTETRICS & GYNECOLOGY
Payer: COMMERCIAL

## 2025-04-01 DIAGNOSIS — D50.0 IRON DEFICIENCY ANEMIA DUE TO CHRONIC BLOOD LOSS: ICD-10-CM

## 2025-04-01 LAB
ABSOLUTE EOSINOPHIL (OHS): 0.05 K/UL
ABSOLUTE MONOCYTE (OHS): 0.52 K/UL (ref 0.3–1)
ABSOLUTE NEUTROPHIL COUNT (OHS): 4.03 K/UL (ref 1.8–7.7)
BASOPHILS # BLD AUTO: 0.02 K/UL
BASOPHILS NFR BLD AUTO: 0.3 %
ERYTHROCYTE [DISTWIDTH] IN BLOOD BY AUTOMATED COUNT: 20.1 % (ref 11.5–14.5)
HCT VFR BLD AUTO: 34.6 % (ref 37–48.5)
HGB BLD-MCNC: 10.7 GM/DL (ref 12–16)
IMM GRANULOCYTES # BLD AUTO: 0.03 K/UL (ref 0–0.04)
IMM GRANULOCYTES NFR BLD AUTO: 0.4 % (ref 0–0.5)
LYMPHOCYTES # BLD AUTO: 2.48 K/UL (ref 1–4.8)
MCH RBC QN AUTO: 25.8 PG (ref 27–31)
MCHC RBC AUTO-ENTMCNC: 30.9 G/DL (ref 32–36)
MCV RBC AUTO: 84 FL (ref 82–98)
NUCLEATED RBC (/100WBC) (OHS): 0 /100 WBC
PLATELET # BLD AUTO: 203 K/UL (ref 150–450)
PMV BLD AUTO: 12.3 FL (ref 9.2–12.9)
RBC # BLD AUTO: 4.14 M/UL (ref 4–5.4)
RELATIVE EOSINOPHIL (OHS): 0.7 %
RELATIVE LYMPHOCYTE (OHS): 34.8 % (ref 18–48)
RELATIVE MONOCYTE (OHS): 7.3 % (ref 4–15)
RELATIVE NEUTROPHIL (OHS): 56.5 % (ref 38–73)
WBC # BLD AUTO: 7.13 K/UL (ref 3.9–12.7)

## 2025-04-01 PROCEDURE — 82728 ASSAY OF FERRITIN: CPT

## 2025-04-01 PROCEDURE — 85025 COMPLETE CBC W/AUTO DIFF WBC: CPT

## 2025-04-01 PROCEDURE — 36415 COLL VENOUS BLD VENIPUNCTURE: CPT | Mod: PN

## 2025-04-01 PROCEDURE — 83540 ASSAY OF IRON: CPT

## 2025-04-02 ENCOUNTER — PATIENT MESSAGE (OUTPATIENT)
Dept: OBSTETRICS AND GYNECOLOGY | Facility: CLINIC | Age: 35
End: 2025-04-02
Payer: COMMERCIAL

## 2025-04-02 DIAGNOSIS — D50.0 IRON DEFICIENCY ANEMIA DUE TO CHRONIC BLOOD LOSS: Primary | ICD-10-CM

## 2025-04-02 LAB
FERRITIN SERPL-MCNC: 9 NG/ML (ref 20–300)
IRON SATN MFR SERPL: 5 % (ref 20–50)
IRON SERPL-MCNC: 28 UG/DL (ref 30–160)
TIBC SERPL-MCNC: 536 UG/DL (ref 250–450)
TRANSFERRIN SERPL-MCNC: 362 MG/DL (ref 200–375)

## 2025-04-03 ENCOUNTER — TELEPHONE (OUTPATIENT)
Dept: HEMATOLOGY/ONCOLOGY | Facility: CLINIC | Age: 35
End: 2025-04-03
Payer: COMMERCIAL

## 2025-04-03 NOTE — PROGRESS NOTES
Hematology Consult Note      Maggy Koenig   female  1990  [id number]    04/04/2025    Referral physician:    Clementine Krause MD  [id number] [address] [address]    Reason for consult: Anemia      Subjective:      History of Present Illness:    Maggy Koenig is a 34 y.o. y/o female referred for evaluation for Anemia. Patient with a h/o mild preeclampsia. Patient also with menorrhagia with regular cycles. Endorses that her cycles are 26-28 days apart lasting 7 days noting heavy flow for 4 days with clots. She is currently on PO iron and tolerating well, denying abdominal discomfort. She does note some nausea and constipation but did start OCPs as well. Symptoms that the patient is experiencing include: restless legs, leg cramping, fatigue, SOB with exertion, headache. She denies picca symptoms.  She does have a significant history of significant history of anemia dating back to 11/17/2016 with Hgb ranging from 9.2 - 13.6 g/dL.   Most recent CBC on 4/1/25 reveals a Hgb of 10.7 g/dL, Hct 34.6%, MCV 84, ferritin 9.0 ng/mL and TIBC 536. CMP from 2/18/25 reveals normal transaminases, creat of 0.7 and eGFR of >60.    Patient had a recent papsmear showing ASC-US followed by an endometrial biopsy, noting no malignancy.    On today's visit, the pt denies fever, chills, headache, sore throat, neck swelling, chest pain, SOB, cough, N/V/D, Abd pain.  Dysuria, hematuria, or weakness in extremities. No back pain, bone pain. Stool or urine incontinence. Denies tingling numbness in fingers/toes.     Other pertinent labs include:  TSH 1.929 2/18/25    Allergies:  Review of patient's allergies indicates:   Allergen Reactions    Ciprofloxacin Hives, Itching and Rash       Current Medications:  Medications Ordered Prior to Encounter[1]    Past Medical History:  Past Medical History:   Diagnosis Date    Abnormal Pap smear of vagina     colpo    HSV infection     Mild preeclampsia     At 37 weeks w/ 1st preg        Past Surgical History:  Past Surgical History:   Procedure Laterality Date     SECTION       SECTION N/A 2021    Procedure:  SECTION;  Surgeon: Clementine Krause MD;  Location: [facility];  Service: OB/GYN;  Laterality: N/A;    COLPOSCOPY         Social History:  Social History[2]      Family History:  Family History   Problem Relation Name Age of Onset    Breast cancer Neg Hx      Ovarian cancer Neg Hx      Uterine cancer Neg Hx      Cervical cancer Neg Hx      Diabetes Neg Hx         Review of Systems:  Review of Systems   Constitutional:  Positive for malaise/fatigue.   Eyes:  Negative for blurred vision.   Respiratory:  Positive for shortness of breath.    Cardiovascular:  Negative for chest pain and palpitations.   Gastrointestinal:  Positive for constipation and nausea (2/2 iron supplements). Negative for abdominal pain and blood in stool.   Genitourinary:  Negative for hematuria.   Skin:  Negative for rash.   Neurological:  Positive for weakness (bilateral legs) and headaches. Negative for dizziness.           Objective:       Physical Examination:  Vitals:   Last menstrual period 2025.    Physical Exam  Constitutional:       General: She is not in acute distress.     Appearance: She is not diaphoretic.   Eyes:      General: No scleral icterus.  Cardiovascular:      Rate and Rhythm: Normal rate and regular rhythm.      Pulses: Normal pulses.      Heart sounds: Murmur heard.   Pulmonary:      Effort: Pulmonary effort is normal. No respiratory distress.      Breath sounds: Normal breath sounds. No wheezing.   Musculoskeletal:      Right lower leg: No edema.      Left lower leg: No edema.   Skin:     General: Skin is warm.      Coloration: Skin is not jaundiced.      Findings: No erythema or rash.   Neurological:      General: No focal deficit present.      Mental Status: She is alert and oriented to person, place, and time.   Psychiatric:         Behavior: Behavior  "normal.         Thought Content: Thought content normal.          Most Recent Data:  CBC:   Lab Results   Component Value Date    WBC 7.13 04/01/2025    HGB 10.7 (L) 04/01/2025    HCT 34.6 (L) 04/01/2025     04/01/2025    MCV 84 04/01/2025    RDW 20.1 (H) 04/01/2025     BMP:   Lab Results   Component Value Date     02/18/2025    K 4.0 02/18/2025     02/18/2025    CO2 21 (L) 02/18/2025    BUN 9 02/18/2025    GLU 88 02/18/2025    CALCIUM 9.4 02/18/2025     LFTs:   Lab Results   Component Value Date    PROT 7.5 02/18/2025    ALBUMIN 4.2 02/18/2025    BILITOT 0.3 02/18/2025    AST 34 02/18/2025    ALKPHOS 63 02/18/2025    ALT 13 02/18/2025     Coags: No results found for: "INR", "PROTIME", "PTT"  FLP: No results found for: "CHOL", "HDL", "LDLCALC", "TRIG", "CHOLHDL"  DM:   Lab Results   Component Value Date    CREATININE 0.7 02/18/2025     Thyroid:   Lab Results   Component Value Date    TSH 1.929 02/18/2025     Anemia:   Lab Results   Component Value Date    IRON 28 (L) 04/01/2025    TIBC 536 (H) 04/01/2025    FERRITIN 9.0 (L) 04/01/2025       Radiology and other Results:  No results found.      Assessment     1. Iron deficiency anemia due to chronic blood loss    2. Menorrhagia with regular cycle            Plan     #Iron deficiency anemia - due to her blood loss from menorrhagia  -Will order Venofer 200 mg x 5 doses   -Stop PO iron once Venofer infusions start  -Patient to RTC in 3 months with CBC, iron studies and ferritin a couple days prior    #Murmur  -heard on assessment  -Will send to cardiology for further workup    #Menorrhagia with regular cycle  -continue follow up with Dr. Krause, gyn    Thank you for the opportunity in participating in the care of this very nice patient. If you have any questions or concerns, please do not hesitate to contact me.     Jenifer Maier, FNP-C  Hematology and Medical Oncology  [facility]  [facility] [place] [facility]    47 minutes of " total time spent on the encounter, which includes face to face time and non-face to face time preparing to see the patient (eg, review of tests), Obtaining and/or reviewing separately obtained history, documenting clinical information in the electronic or other health record, independently interpreting results if documented above (not separately reported) and communicating results to the patient/family/caregiver, or Care coordination (not separately reported).     Route Chart for Scheduling    Med Onc Chart Routing      Follow up with physician    Follow up with TON . Patient needs appt with cardiologist. She will need Venofer infusions x 5. She will need CBC, iron studies and ferritin a couple days prior to seeing me in 3 months.   Infusion scheduling note    Injection scheduling note    Labs    Imaging    Pharmacy appointment    Other referrals                    Therapy Plan Information  OP IV IRON THERAPY for Iron deficiency anemia due to chronic blood loss, noted on 4/4/2025  Medications  iron sucrose injection 200 mg  200 mg, Intravenous, 1 time a week  Anaphylaxis/Hypersensitivity  EPINEPHrine (EPIPEN) 0.3 mg/0.3 mL pen injection 0.3 mg  0.3 mg, Intramuscular, PRN  hydrocortisone sodium succinate injection 100 mg  100 mg, Intravenous, PRN  Flushes  0.9% NaCl 250 mL flush bag  Intravenous, Every visit  sodium chloride 0.9% flush 10 mL  10 mL, Intravenous, Every visit  heparin, porcine (PF) 100 unit/mL injection flush 500 Units  500 Units, Intravenous, Every visit  alteplase injection 2 mg  2 mg, Intra-Catheter, Every visit      No therapy plan of the specified type found.    No therapy plan of the specified type found.           [1]   Current Outpatient Medications on File Prior to Visit   Medication Sig Dispense Refill    ibuprofen (ADVIL,MOTRIN) 600 MG tablet Take 1 tablet (600 mg total) by mouth every 6 (six) hours. 30 tablet 0    norethindrone-ethinyl estradiol (JUNEL FE 1/20) 1 mg-20 mcg (21)/75 mg (7)  per tablet Take 1 tablet by mouth once daily. 90 tablet 3    oxyCODONE-acetaminophen (PERCOCET) 5-325 mg per tablet Take 1 tablet by mouth every 4 (four) hours as needed. 15 tablet 0    PNV #22-iron ps,xs-CC-[id number]-dha 28 mg iron-6 mg iron-1 mg Cmpk Take 1 tablet by mouth once daily.       valACYclovir (VALTREX) 500 MG tablet TAKE 1 TABLET(500 MG) BY MOUTH TWICE DAILY 60 tablet 1     No current facility-administered medications on file prior to visit.   [2]   Social History  Tobacco Use    Smoking status: Never    Smokeless tobacco: Never   Substance Use Topics    Alcohol use: No    Drug use: No

## 2025-04-04 ENCOUNTER — OFFICE VISIT (OUTPATIENT)
Dept: HEMATOLOGY/ONCOLOGY | Facility: CLINIC | Age: 35
End: 2025-04-04
Payer: COMMERCIAL

## 2025-04-04 VITALS
HEART RATE: 70 BPM | WEIGHT: 159.19 LBS | HEIGHT: 63 IN | DIASTOLIC BLOOD PRESSURE: 57 MMHG | SYSTOLIC BLOOD PRESSURE: 115 MMHG | OXYGEN SATURATION: 97 % | RESPIRATION RATE: 17 BRPM | TEMPERATURE: 98 F | BODY MASS INDEX: 28.21 KG/M2

## 2025-04-04 DIAGNOSIS — D50.0 IRON DEFICIENCY ANEMIA DUE TO CHRONIC BLOOD LOSS: Primary | ICD-10-CM

## 2025-04-04 DIAGNOSIS — N92.0 MENORRHAGIA WITH REGULAR CYCLE: ICD-10-CM

## 2025-04-04 DIAGNOSIS — R01.1 MURMUR: ICD-10-CM

## 2025-04-04 PROCEDURE — 99999 PR PBB SHADOW E&M-EST. PATIENT-LVL V: CPT | Mod: PBBFAC,,, | Performed by: NURSE PRACTITIONER

## 2025-04-04 RX ORDER — SODIUM CHLORIDE 0.9 % (FLUSH) 0.9 %
10 SYRINGE (ML) INJECTION
OUTPATIENT
Start: 2025-04-08

## 2025-04-04 RX ORDER — EPINEPHRINE 0.3 MG/.3ML
0.3 INJECTION SUBCUTANEOUS ONCE AS NEEDED
OUTPATIENT
Start: 2025-04-08

## 2025-04-04 RX ORDER — HEPARIN 100 UNIT/ML
500 SYRINGE INTRAVENOUS
OUTPATIENT
Start: 2025-04-08

## 2025-04-08 ENCOUNTER — PATIENT MESSAGE (OUTPATIENT)
Dept: INFUSION THERAPY | Facility: HOSPITAL | Age: 35
End: 2025-04-08
Payer: COMMERCIAL

## 2025-04-09 ENCOUNTER — TELEPHONE (OUTPATIENT)
Dept: INFUSION THERAPY | Facility: HOSPITAL | Age: 35
End: 2025-04-09
Payer: COMMERCIAL

## 2025-04-09 NOTE — TELEPHONE ENCOUNTER
----- Message from Frances sent at 4/8/2025  5:26 PM CDT -----  Type: Appointment RequestCaller is requesting appointment.  Name of Caller:Clary is the first available appointment?naSymptoms: iron infusionWould the patient rather a call back or a response via [facility]? Call [facility] Call Back Number:[phone] Information:      Please call Back to advise. Thanks!

## 2025-04-14 ENCOUNTER — TELEPHONE (OUTPATIENT)
Dept: INFUSION THERAPY | Facility: HOSPITAL | Age: 35
End: 2025-04-14
Payer: COMMERCIAL

## 2025-04-14 NOTE — TELEPHONE ENCOUNTER
----- Message from Go Dish sent at 4/10/2025  3:55 PM CDT -----  Type:  Patient Returning CallWho Called:  Tena Bassett Message for Patient:  Diogeness the patient know what this is regarding?:  iron infusion Best Call Back Number:  [phone] Information:  april is returning call to schedule iron infusion .  Please call to further assist thank you .

## 2025-04-30 ENCOUNTER — INFUSION (OUTPATIENT)
Dept: INFUSION THERAPY | Facility: HOSPITAL | Age: 35
End: 2025-04-30
Attending: NURSE PRACTITIONER
Payer: COMMERCIAL

## 2025-04-30 VITALS
BODY MASS INDEX: 28.13 KG/M2 | DIASTOLIC BLOOD PRESSURE: 72 MMHG | HEART RATE: 60 BPM | HEIGHT: 63 IN | RESPIRATION RATE: 17 BRPM | OXYGEN SATURATION: 100 % | SYSTOLIC BLOOD PRESSURE: 108 MMHG | WEIGHT: 158.75 LBS | TEMPERATURE: 98 F

## 2025-04-30 DIAGNOSIS — D50.0 IRON DEFICIENCY ANEMIA DUE TO CHRONIC BLOOD LOSS: Primary | ICD-10-CM

## 2025-04-30 PROCEDURE — 63600175 PHARM REV CODE 636 W HCPCS: Mod: PN | Performed by: NURSE PRACTITIONER

## 2025-04-30 PROCEDURE — 96374 THER/PROPH/DIAG INJ IV PUSH: CPT | Mod: PN

## 2025-04-30 RX ORDER — HEPARIN 100 UNIT/ML
500 SYRINGE INTRAVENOUS
Status: DISCONTINUED | OUTPATIENT
Start: 2025-04-30 | End: 2025-04-30 | Stop reason: HOSPADM

## 2025-04-30 RX ORDER — SODIUM CHLORIDE 0.9 % (FLUSH) 0.9 %
10 SYRINGE (ML) INJECTION
OUTPATIENT
Start: 2025-05-07

## 2025-04-30 RX ORDER — EPINEPHRINE 0.3 MG/.3ML
0.3 INJECTION SUBCUTANEOUS ONCE AS NEEDED
OUTPATIENT
Start: 2025-05-07

## 2025-04-30 RX ORDER — SODIUM CHLORIDE 0.9 % (FLUSH) 0.9 %
10 SYRINGE (ML) INJECTION
Status: DISCONTINUED | OUTPATIENT
Start: 2025-04-30 | End: 2025-04-30 | Stop reason: HOSPADM

## 2025-04-30 RX ORDER — HEPARIN 100 UNIT/ML
500 SYRINGE INTRAVENOUS
OUTPATIENT
Start: 2025-05-07

## 2025-04-30 RX ADMIN — IRON SUCROSE 200 MG: 20 INJECTION, SOLUTION INTRAVENOUS at 03:04

## 2025-04-30 NOTE — PLAN OF CARE
Problem: Adult Inpatient Plan of Care  Goal: Patient-Specific Goal (Individualized)  Flowsheets (Taken 4/30/2025 1613)  Individualized Care Needs: Recliner, warm blanket, conversation, education  Anxieties, Fears or Concerns: None  Patient/Family-Specific Goals (Include Timeframe): No s/s of reaction from treatment     Problem: Fatigue  Goal: Improved Activity Tolerance  Intervention: Promote Improved Energy  Flowsheets (Taken 4/30/2025 1613)  Fatigue Management:   activity assistance provided   fatigue-related activity identified   paced activity encouraged   frequent rest breaks encouraged  Sleep/Rest Enhancement:   natural light exposure provided   relaxation techniques promoted  Activity Management:   Ambulated -L4   Up in chair - L3  Environmental Support:   personal routine supported   rest periods encouraged   environmental consistency promoted   rooming-in facilitated     Problem: Adult Inpatient Plan of Care  Goal: Plan of Care Review  Outcome: Progressing  Flowsheets (Taken 4/30/2025 1613)  Plan of Care Reviewed With: patient    Pt tolerated Venofer infusion well, NAD. Pt observed 30 min closely with no s/s of reaction. Pt given a schedule and reviewed, pt verbalized understanding. Pt ambulated out of the clinic without difficulty independently.

## 2025-05-06 ENCOUNTER — PATIENT MESSAGE (OUTPATIENT)
Dept: INFUSION THERAPY | Facility: HOSPITAL | Age: 35
End: 2025-05-06
Payer: COMMERCIAL

## 2025-05-09 ENCOUNTER — INFUSION (OUTPATIENT)
Dept: INFUSION THERAPY | Facility: HOSPITAL | Age: 35
End: 2025-05-09
Attending: NURSE PRACTITIONER
Payer: COMMERCIAL

## 2025-05-09 VITALS
HEIGHT: 63 IN | BODY MASS INDEX: 28.16 KG/M2 | SYSTOLIC BLOOD PRESSURE: 111 MMHG | WEIGHT: 158.94 LBS | OXYGEN SATURATION: 100 % | RESPIRATION RATE: 16 BRPM | HEART RATE: 65 BPM | TEMPERATURE: 98 F | DIASTOLIC BLOOD PRESSURE: 65 MMHG

## 2025-05-09 DIAGNOSIS — D50.0 IRON DEFICIENCY ANEMIA DUE TO CHRONIC BLOOD LOSS: Primary | ICD-10-CM

## 2025-05-09 PROCEDURE — 96374 THER/PROPH/DIAG INJ IV PUSH: CPT | Mod: PN

## 2025-05-09 PROCEDURE — 63600175 PHARM REV CODE 636 W HCPCS: Mod: PN | Performed by: NURSE PRACTITIONER

## 2025-05-09 RX ORDER — EPINEPHRINE 0.3 MG/.3ML
0.3 INJECTION SUBCUTANEOUS ONCE AS NEEDED
OUTPATIENT
Start: 2025-05-14

## 2025-05-09 RX ORDER — SODIUM CHLORIDE 0.9 % (FLUSH) 0.9 %
10 SYRINGE (ML) INJECTION
OUTPATIENT
Start: 2025-05-14

## 2025-05-09 RX ORDER — HEPARIN 100 UNIT/ML
500 SYRINGE INTRAVENOUS
OUTPATIENT
Start: 2025-05-14

## 2025-05-09 RX ADMIN — IRON SUCROSE 200 MG: 20 INJECTION, SOLUTION INTRAVENOUS at 09:05

## 2025-05-09 NOTE — NURSING
Pt arrived to infusion clinic for Venofer 200 mg IVP over 3 minutes. Pt tolerated well. Was observed for 30 minutes after infusion.  NAD noted at this time.

## 2025-05-09 NOTE — PLAN OF CARE
Problem: Fatigue  Goal: Improved Activity Tolerance  Outcome: Progressing  Intervention: Promote Improved Energy  Flowsheets (Taken 5/9/2025 8196)  Activity Management: Ambulated -L4  Environmental Support: rest periods encouraged

## 2025-05-12 ENCOUNTER — ON-DEMAND VIRTUAL (OUTPATIENT)
Dept: URGENT CARE | Facility: CLINIC | Age: 35
End: 2025-05-12
Payer: COMMERCIAL

## 2025-05-12 DIAGNOSIS — B37.31 VAGINAL CANDIDA: Primary | ICD-10-CM

## 2025-05-12 PROCEDURE — 98005 SYNCH AUDIO-VIDEO EST LOW 20: CPT | Mod: 95,,, | Performed by: NURSE PRACTITIONER

## 2025-05-12 RX ORDER — FLUCONAZOLE 150 MG/1
150 TABLET ORAL
Qty: 2 TABLET | Refills: 0 | Status: SHIPPED | OUTPATIENT
Start: 2025-05-12 | End: 2025-05-15

## 2025-05-12 NOTE — PROGRESS NOTES
Subjective:      Patient ID: Maggy Koenig is a 34 y.o. female.    Vitals:  vitals were not taken for this visit.     Chief Complaint: Vaginitis      Visit Type: TELE AUDIOVISUAL    Past Medical History:   Diagnosis Date    Abnormal Pap smear of vagina     colpo    HSV infection     Mild preeclampsia     At 37 weeks w/ 1st preg     Past Surgical History:   Procedure Laterality Date     SECTION       SECTION N/A 2021    Procedure:  SECTION;  Surgeon: Clementine Krause MD;  Location: [facility];  Service: OB/GYN;  Laterality: N/A;    COLPOSCOPY       Review of patient's allergies indicates:   Allergen Reactions    Ciprofloxacin Hives, Itching and Rash     Medications Ordered Prior to Encounter[1]  Family History   Problem Relation Name Age of Onset    Breast cancer Neg Hx      Ovarian cancer Neg Hx      Uterine cancer Neg Hx      Cervical cancer Neg Hx      Diabetes Neg Hx         Medications Ordered                [id number] [place] [place] -  [facility]    [facility] [place] [address]    Telephone: [phone]   Fax: [phone]   Hours: Not open 24 hours                         E-Prescribed (1 of 1)              fluconazole (DIFLUCAN) 150 MG Tab    Sig: Take 1 tablet (150 mg total) by mouth every 48 hours. for 2 doses       Start: 25     Quantity: 2 tablet Refills: 0                           Ohs Peq Odvv Intake    2025  6:02 PM CDT - Filed by Patient   What is your current physical address in the event of a medical emergency[address]   Are you able to take your vital signs? No   Please attach any relevant images or files    Is your employer contracted with [facility]? No         Presents with vaginal irritation/itch which has been unrelieved by OTC treatment.    Two patient identifiers were used-name was repeated verbally as well as date of birth.  The patient was located  outdoors in the state of [place].          Genitourinary:         Vaginal irritation          Objective:   The physical exam was conducted virtually.  Physical Exam   Constitutional: She is oriented to person, place, and time. No distress.   HENT:   Head: Normocephalic and atraumatic.   Neck: Neck supple.   Pulmonary/Chest: Effort normal. No respiratory distress.   Abdominal: Normal appearance.   Musculoskeletal: Normal range of motion.         General: Normal range of motion.   Neurological: no focal deficit. She is alert and oriented to person, place, and time.   Skin: Skin is not pale.   Psychiatric: Her behavior is normal. Mood, judgment and thought content normal.       Assessment:     1. Vaginal candida        Plan:       Vaginal candida    Other orders  -     fluconazole (DIFLUCAN) 150 MG Tab; Take 1 tablet (150 mg total) by mouth every 48 hours. for 2 doses  Dispense: 2 tablet; Refill: 0      Meds as above.  F/u in clinic if symptoms fail to resolve with treatment.    You must understand that you've received a virtual Care treatment only and that you may be released before all your medical problems are known or treated. You, the patient, will arrange for follow up care as instructed.  If your condition worsens we recommend that you receive another evaluation at an urgent care in person, the emergency room or contact your primary medical clinics after hours call service to discuss your concerns.            Present with the patient at the time of consultation: TELEMED PRESENT WITH PATIENT: None           [1]   Current Outpatient Medications on File Prior to Visit   Medication Sig Dispense Refill    ibuprofen (ADVIL,MOTRIN) 600 MG tablet Take 1 tablet (600 mg total) by mouth every 6 (six) hours. 30 tablet 0    norethindrone-ethinyl estradiol (JUNEL FE 1/20) 1 mg-20 mcg (21)/75 mg (7) per tablet Take 1 tablet by mouth once daily. 90 tablet 3    oxyCODONE-acetaminophen (PERCOCET) 5-325 mg per tablet Take 1  tablet by mouth every 4 (four) hours as needed. 15 tablet 0    PNV #22-iron [id number] 28 mg iron-6 mg iron-1 mg Cmpk Take 1 tablet by mouth once daily.       valACYclovir (VALTREX) 500 MG tablet TAKE 1 TABLET(500 MG) BY MOUTH TWICE DAILY 60 tablet 1     No current facility-administered medications on file prior to visit.

## 2025-05-16 ENCOUNTER — INFUSION (OUTPATIENT)
Dept: INFUSION THERAPY | Facility: HOSPITAL | Age: 35
End: 2025-05-16
Attending: NURSE PRACTITIONER
Payer: COMMERCIAL

## 2025-05-16 VITALS
OXYGEN SATURATION: 100 % | HEART RATE: 65 BPM | BODY MASS INDEX: 28.16 KG/M2 | HEIGHT: 63 IN | WEIGHT: 158.94 LBS | DIASTOLIC BLOOD PRESSURE: 58 MMHG | SYSTOLIC BLOOD PRESSURE: 118 MMHG | TEMPERATURE: 98 F | RESPIRATION RATE: 17 BRPM

## 2025-05-16 DIAGNOSIS — D50.0 IRON DEFICIENCY ANEMIA DUE TO CHRONIC BLOOD LOSS: Primary | ICD-10-CM

## 2025-05-16 PROCEDURE — 96374 THER/PROPH/DIAG INJ IV PUSH: CPT | Mod: PN

## 2025-05-16 PROCEDURE — 63600175 PHARM REV CODE 636 W HCPCS: Mod: PN | Performed by: NURSE PRACTITIONER

## 2025-05-16 PROCEDURE — 25000003 PHARM REV CODE 250: Mod: PN | Performed by: NURSE PRACTITIONER

## 2025-05-16 PROCEDURE — A4216 STERILE WATER/SALINE, 10 ML: HCPCS | Mod: PN | Performed by: NURSE PRACTITIONER

## 2025-05-16 RX ORDER — HEPARIN 100 UNIT/ML
500 SYRINGE INTRAVENOUS
OUTPATIENT
Start: 2025-05-23

## 2025-05-16 RX ORDER — SODIUM CHLORIDE 0.9 % (FLUSH) 0.9 %
10 SYRINGE (ML) INJECTION
OUTPATIENT
Start: 2025-05-23

## 2025-05-16 RX ORDER — EPINEPHRINE 0.3 MG/.3ML
0.3 INJECTION SUBCUTANEOUS ONCE AS NEEDED
Status: DISCONTINUED | OUTPATIENT
Start: 2025-05-16 | End: 2025-05-16 | Stop reason: HOSPADM

## 2025-05-16 RX ORDER — EPINEPHRINE 0.3 MG/.3ML
0.3 INJECTION SUBCUTANEOUS ONCE AS NEEDED
OUTPATIENT
Start: 2025-05-23

## 2025-05-16 RX ORDER — SODIUM CHLORIDE 0.9 % (FLUSH) 0.9 %
10 SYRINGE (ML) INJECTION
Status: DISCONTINUED | OUTPATIENT
Start: 2025-05-16 | End: 2025-05-16 | Stop reason: HOSPADM

## 2025-05-16 RX ORDER — HEPARIN 100 UNIT/ML
500 SYRINGE INTRAVENOUS
Status: DISCONTINUED | OUTPATIENT
Start: 2025-05-16 | End: 2025-05-16 | Stop reason: HOSPADM

## 2025-05-16 RX ADMIN — Medication 10 ML: at 02:05

## 2025-05-16 RX ADMIN — IRON SUCROSE 200 MG: 20 INJECTION, SOLUTION INTRAVENOUS at 01:05

## 2025-05-16 NOTE — PLAN OF CARE
Problem: Adult Inpatient Plan of Care  Goal: Plan of Care Review  Outcome: Progressing  Flowsheets (Taken 5/16/2025 1405)  Plan of Care Reviewed With: patient  Goal: Patient-Specific Goal (Individualized)  Outcome: Progressing  Flowsheets (Taken 5/16/2025 1405)  Individualized Care Needs: recliner, blanket, conversation  Anxieties, Fears or Concerns: none  Patient/Family-Specific Goals (Include Timeframe): no s/s of reaction with tx     Problem: Fatigue  Goal: Improved Activity Tolerance  Outcome: Progressing  Intervention: Promote Improved Energy  Flowsheets (Taken 5/16/2025 1405)  Fatigue Management: paced activity encouraged  Sleep/Rest Enhancement: natural light exposure provided  Activity Management:   Ambulated -L4   Up in chair - L3  Environmental Support: environmental consistency promoted  Pt. tolerated IVP venofer well. VS stable, no adverse reactions noted. 30 min post observation refused. Discussed future appointments, NAD noted. Pt. discharged to home ambulatory with no assistance needed.

## 2025-05-23 ENCOUNTER — INFUSION (OUTPATIENT)
Dept: INFUSION THERAPY | Facility: HOSPITAL | Age: 35
End: 2025-05-23
Attending: NURSE PRACTITIONER
Payer: COMMERCIAL

## 2025-05-23 VITALS
DIASTOLIC BLOOD PRESSURE: 67 MMHG | BODY MASS INDEX: 28.36 KG/M2 | WEIGHT: 160.06 LBS | HEART RATE: 72 BPM | TEMPERATURE: 98 F | SYSTOLIC BLOOD PRESSURE: 111 MMHG | OXYGEN SATURATION: 98 % | RESPIRATION RATE: 16 BRPM | HEIGHT: 63 IN

## 2025-05-23 DIAGNOSIS — D50.0 IRON DEFICIENCY ANEMIA DUE TO CHRONIC BLOOD LOSS: Primary | ICD-10-CM

## 2025-05-23 PROCEDURE — 63600175 PHARM REV CODE 636 W HCPCS: Mod: PN | Performed by: NURSE PRACTITIONER

## 2025-05-23 PROCEDURE — 96374 THER/PROPH/DIAG INJ IV PUSH: CPT | Mod: PN

## 2025-05-23 RX ORDER — SODIUM CHLORIDE 0.9 % (FLUSH) 0.9 %
10 SYRINGE (ML) INJECTION
Status: DISCONTINUED | OUTPATIENT
Start: 2025-05-23 | End: 2025-05-23 | Stop reason: HOSPADM

## 2025-05-23 RX ORDER — SODIUM CHLORIDE 0.9 % (FLUSH) 0.9 %
10 SYRINGE (ML) INJECTION
OUTPATIENT
Start: 2025-05-30

## 2025-05-23 RX ORDER — NITROFURANTOIN 25; 75 MG/1; MG/1
100 CAPSULE ORAL 2 TIMES DAILY
COMMUNITY
Start: 2025-05-19

## 2025-05-23 RX ORDER — EPINEPHRINE 0.3 MG/.3ML
0.3 INJECTION SUBCUTANEOUS ONCE AS NEEDED
Status: DISCONTINUED | OUTPATIENT
Start: 2025-05-23 | End: 2025-05-23 | Stop reason: HOSPADM

## 2025-05-23 RX ORDER — EPINEPHRINE 0.3 MG/.3ML
0.3 INJECTION SUBCUTANEOUS ONCE AS NEEDED
OUTPATIENT
Start: 2025-05-30

## 2025-05-23 RX ORDER — HEPARIN 100 UNIT/ML
500 SYRINGE INTRAVENOUS
OUTPATIENT
Start: 2025-05-30

## 2025-05-23 RX ADMIN — IRON SUCROSE 200 MG: 20 INJECTION, SOLUTION INTRAVENOUS at 03:05

## 2025-05-23 NOTE — PLAN OF CARE
Problem: Adult Inpatient Plan of Care  Goal: Plan of Care Review  Outcome: Progressing  Flowsheets (Taken 5/23/2025 5688)  Plan of Care Reviewed With: patient   Pt tolerated venofer 200mg iv push well.  No adverse reaction noted.   Pt observed for 30min post iron  IV flushed with NS and D/C per protocol.  Patient left clinic in no acute distress.

## 2025-05-23 NOTE — PLAN OF CARE
Problem: Adult Inpatient Plan of Care  Goal: Patient-Specific Goal (Individualized)  Outcome: Progressing  Flowsheets (Taken 5/23/2025 1532)  Individualized Care Needs: recliner  Anxieties, Fears or Concerns: none today  Patient/Family-Specific Goals (Include Timeframe): no s/s of rx with tx today     Problem: Fatigue  Goal: Improved Activity Tolerance  Outcome: Progressing  Intervention: Promote Improved Energy  Flowsheets (Taken 5/23/2025 1532)  Fatigue Management: frequent rest breaks encouraged  Sleep/Rest Enhancement: regular sleep/rest pattern promoted  Activity Management: Ambulated -L4  Environmental Support: rest periods encouraged

## 2025-05-28 ENCOUNTER — INFUSION (OUTPATIENT)
Dept: INFUSION THERAPY | Facility: HOSPITAL | Age: 35
End: 2025-05-28
Attending: NURSE PRACTITIONER
Payer: COMMERCIAL

## 2025-05-28 VITALS
OXYGEN SATURATION: 97 % | RESPIRATION RATE: 17 BRPM | BODY MASS INDEX: 28.36 KG/M2 | SYSTOLIC BLOOD PRESSURE: 103 MMHG | WEIGHT: 160.06 LBS | HEART RATE: 78 BPM | TEMPERATURE: 98 F | DIASTOLIC BLOOD PRESSURE: 68 MMHG | HEIGHT: 63 IN

## 2025-05-28 DIAGNOSIS — D50.0 IRON DEFICIENCY ANEMIA DUE TO CHRONIC BLOOD LOSS: Primary | ICD-10-CM

## 2025-05-28 PROCEDURE — 63600175 PHARM REV CODE 636 W HCPCS: Mod: PN | Performed by: NURSE PRACTITIONER

## 2025-05-28 PROCEDURE — 96374 THER/PROPH/DIAG INJ IV PUSH: CPT | Mod: PN

## 2025-05-28 PROCEDURE — 25000003 PHARM REV CODE 250: Mod: PN | Performed by: NURSE PRACTITIONER

## 2025-05-28 PROCEDURE — A4216 STERILE WATER/SALINE, 10 ML: HCPCS | Mod: PN | Performed by: NURSE PRACTITIONER

## 2025-05-28 RX ORDER — SODIUM CHLORIDE 0.9 % (FLUSH) 0.9 %
10 SYRINGE (ML) INJECTION
Status: DISCONTINUED | OUTPATIENT
Start: 2025-05-28 | End: 2025-05-28 | Stop reason: HOSPADM

## 2025-05-28 RX ORDER — SODIUM CHLORIDE 0.9 % (FLUSH) 0.9 %
10 SYRINGE (ML) INJECTION
OUTPATIENT
Start: 2025-05-30

## 2025-05-28 RX ORDER — HEPARIN 100 UNIT/ML
500 SYRINGE INTRAVENOUS
Status: DISCONTINUED | OUTPATIENT
Start: 2025-05-28 | End: 2025-05-28 | Stop reason: HOSPADM

## 2025-05-28 RX ORDER — HEPARIN 100 UNIT/ML
500 SYRINGE INTRAVENOUS
OUTPATIENT
Start: 2025-05-30

## 2025-05-28 RX ORDER — EPINEPHRINE 0.3 MG/.3ML
0.3 INJECTION SUBCUTANEOUS ONCE AS NEEDED
OUTPATIENT
Start: 2025-05-30

## 2025-05-28 RX ORDER — EPINEPHRINE 0.3 MG/.3ML
0.3 INJECTION SUBCUTANEOUS ONCE AS NEEDED
Status: DISCONTINUED | OUTPATIENT
Start: 2025-05-28 | End: 2025-05-28 | Stop reason: HOSPADM

## 2025-05-28 RX ADMIN — IRON SUCROSE 200 MG: 20 INJECTION, SOLUTION INTRAVENOUS at 03:05

## 2025-05-28 RX ADMIN — Medication 10 ML: at 04:05

## 2025-05-28 NOTE — PLAN OF CARE
Problem: Adult Inpatient Plan of Care  Goal: Plan of Care Review  Outcome: Progressing  Flowsheets (Taken 5/28/2025 1626)  Plan of Care Reviewed With: patient  Goal: Patient-Specific Goal (Individualized)  Outcome: Progressing  Flowsheets (Taken 5/28/2025 1626)  Individualized Care Needs: recliner, blanket, conversation  Anxieties, Fears or Concerns: none  Patient/Family-Specific Goals (Include Timeframe): no s/s of reaction with tx     Problem: Fatigue  Goal: Improved Activity Tolerance  Outcome: Progressing  Intervention: Promote Improved Energy  Flowsheets (Taken 5/28/2025 1626)  Fatigue Management: paced activity encouraged  Sleep/Rest Enhancement: natural light exposure provided  Activity Management:   Ambulated -L4   Up in chair - L3  Environmental Support:   environmental consistency promoted   distractions minimized  Pt. tolerated IVP venofer well. VS stable, no adverse reactions noted. 30 min post observation completed. Discussed future appointments, NAD noted. Pt. discharged to home ambulatory with no assistance needed.

## 2025-07-03 ENCOUNTER — LAB VISIT (OUTPATIENT)
Dept: LAB | Facility: HOSPITAL | Age: 35
End: 2025-07-03
Payer: COMMERCIAL

## 2025-07-03 DIAGNOSIS — D50.0 IRON DEFICIENCY ANEMIA DUE TO CHRONIC BLOOD LOSS: ICD-10-CM

## 2025-07-03 LAB
ABSOLUTE EOSINOPHIL (OHS): 0.09 K/UL
ABSOLUTE MONOCYTE (OHS): 0.41 K/UL (ref 0.3–1)
ABSOLUTE NEUTROPHIL COUNT (OHS): 4.09 K/UL (ref 1.8–7.7)
BASOPHILS # BLD AUTO: 0.03 K/UL
BASOPHILS NFR BLD AUTO: 0.5 %
ERYTHROCYTE [DISTWIDTH] IN BLOOD BY AUTOMATED COUNT: 15.3 % (ref 11.5–14.5)
FERRITIN SERPL-MCNC: 193 NG/ML (ref 20–300)
HCT VFR BLD AUTO: 41.4 % (ref 37–48.5)
HGB BLD-MCNC: 13.5 GM/DL (ref 12–16)
IMM GRANULOCYTES # BLD AUTO: 0.02 K/UL (ref 0–0.04)
IMM GRANULOCYTES NFR BLD AUTO: 0.3 % (ref 0–0.5)
IRON SATN MFR SERPL: 28 % (ref 20–50)
IRON SERPL-MCNC: 107 UG/DL (ref 30–160)
LYMPHOCYTES # BLD AUTO: 1.93 K/UL (ref 1–4.8)
MCH RBC QN AUTO: 29.9 PG (ref 27–31)
MCHC RBC AUTO-ENTMCNC: 32.6 G/DL (ref 32–36)
MCV RBC AUTO: 92 FL (ref 82–98)
NUCLEATED RBC (/100WBC) (OHS): 0 /100 WBC
PLATELET # BLD AUTO: 224 K/UL (ref 150–450)
PMV BLD AUTO: 12 FL (ref 9.2–12.9)
RBC # BLD AUTO: 4.51 M/UL (ref 4–5.4)
RELATIVE EOSINOPHIL (OHS): 1.4 %
RELATIVE LYMPHOCYTE (OHS): 29.4 % (ref 18–48)
RELATIVE MONOCYTE (OHS): 6.2 % (ref 4–15)
RELATIVE NEUTROPHIL (OHS): 62.2 % (ref 38–73)
TIBC SERPL-MCNC: 388 UG/DL (ref 250–450)
TRANSFERRIN SERPL-MCNC: 262 MG/DL (ref 200–375)
WBC # BLD AUTO: 6.57 K/UL (ref 3.9–12.7)

## 2025-07-03 PROCEDURE — 85025 COMPLETE CBC W/AUTO DIFF WBC: CPT

## 2025-07-03 PROCEDURE — 82728 ASSAY OF FERRITIN: CPT

## 2025-07-03 PROCEDURE — 84466 ASSAY OF TRANSFERRIN: CPT

## 2025-07-03 PROCEDURE — 36415 COLL VENOUS BLD VENIPUNCTURE: CPT | Mod: PN

## 2025-07-07 ENCOUNTER — OFFICE VISIT (OUTPATIENT)
Dept: HEMATOLOGY/ONCOLOGY | Facility: CLINIC | Age: 35
End: 2025-07-07
Payer: COMMERCIAL

## 2025-07-07 VITALS
BODY MASS INDEX: 28 KG/M2 | OXYGEN SATURATION: 97 % | TEMPERATURE: 99 F | RESPIRATION RATE: 18 BRPM | HEIGHT: 63 IN | WEIGHT: 158.06 LBS | HEART RATE: 76 BPM | DIASTOLIC BLOOD PRESSURE: 70 MMHG | SYSTOLIC BLOOD PRESSURE: 110 MMHG

## 2025-07-07 DIAGNOSIS — N92.0 MENORRHAGIA WITH REGULAR CYCLE: ICD-10-CM

## 2025-07-07 DIAGNOSIS — D50.0 IRON DEFICIENCY ANEMIA DUE TO CHRONIC BLOOD LOSS: Primary | ICD-10-CM

## 2025-07-07 PROCEDURE — 3078F DIAST BP <80 MM HG: CPT | Mod: CPTII,S$GLB,, | Performed by: NURSE PRACTITIONER

## 2025-07-07 PROCEDURE — 3008F BODY MASS INDEX DOCD: CPT | Mod: CPTII,S$GLB,, | Performed by: NURSE PRACTITIONER

## 2025-07-07 PROCEDURE — 3074F SYST BP LT 130 MM HG: CPT | Mod: CPTII,S$GLB,, | Performed by: NURSE PRACTITIONER

## 2025-07-07 PROCEDURE — 99213 OFFICE O/P EST LOW 20 MIN: CPT | Mod: S$GLB,,, | Performed by: NURSE PRACTITIONER

## 2025-07-07 PROCEDURE — 1160F RVW MEDS BY RX/DR IN RCRD: CPT | Mod: CPTII,S$GLB,, | Performed by: NURSE PRACTITIONER

## 2025-07-07 PROCEDURE — 1159F MED LIST DOCD IN RCRD: CPT | Mod: CPTII,S$GLB,, | Performed by: NURSE PRACTITIONER

## 2025-07-07 PROCEDURE — 99999 PR PBB SHADOW E&M-EST. PATIENT-LVL IV: CPT | Mod: PBBFAC,,, | Performed by: NURSE PRACTITIONER

## 2025-07-07 NOTE — PROGRESS NOTES
Hematology Consult Note      Maggy Koenig   female  1990  [id number]    07/07/2025      Subjective:      History of Present Illness:    Maggy Koenig is a 34 y.o. y/o female presents to clinic for follow up on iron deficiency anemia. Symptoms of restless legs, leg cramping, fatigue SOB with exertion  and headaches have all resolved or improved.    On today's visit, the pt denies fever, chills, headache, sore throat, neck swelling, chest pain, SOB, cough, N/V/D, abd pain.  Dysuria, hematuria, or weakness in extremities. No back pain, bone pain. Stool or urine incontinence. Denies tingling numbness in fingers/toes.     Most recent CBC on 7/3/25 reveals a Hgb of 13.5 g/dL, Hct 41.4%, MCV 92, ferritin 193 ng/mL and TIBC 388. CMP from 2/18/25 reveals normal transaminases, creat of 0.7 and eGFR of >60.    History: Patient with a h/o mild preeclampsia. Patient also with menorrhagia with regular cycles. Endorses that her cycles are 26-28 days apart lasting 7 days noting heavy flow for 4 days with clots. She is currently on PO iron and tolerating well, denying abdominal discomfort. She does note some nausea and constipation but did start OCPs as well.   She does have a significant history of significant history of anemia dating back to 11/17/2016 with Hgb ranging from 9.2 - 13.6 g/dL.     Patient had a recent pap smear on 2/18/25 showing ASC-US followed by an endometrial biopsy on 3/13/25, noting no malignancy.    Other pertinent labs include:  TSH 1.929 2/18/25    Allergies:  Review of patient's allergies indicates:   Allergen Reactions    Ciprofloxacin Hives, Itching and Rash       Current Medications:  Medications Ordered Prior to Encounter[1]    Past Medical History:  Past Medical History:   Diagnosis Date    Abnormal Pap smear of vagina     colpo    HSV infection     Mild preeclampsia     At 37 weeks w/ 1st preg       Past Surgical History:  Past Surgical History:   Procedure Laterality Date     " SECTION       SECTION N/A 2021    Procedure:  SECTION;  Surgeon: Clementine Krause MD;  Location: [facility]D;  Service: OB/GYN;  Laterality: N/A;    COLPOSCOPY         Social History:  Social History[2]      Family History:  Family History   Problem Relation Name Age of Onset    Breast cancer Neg Hx      Ovarian cancer Neg Hx      Uterine cancer Neg Hx      Cervical cancer Neg Hx      Diabetes Neg Hx         Review of Systems:  Review of Systems   Constitutional:  Positive for malaise/fatigue (improved).   Eyes:  Negative for blurred vision.   Respiratory:  Negative for shortness of breath.    Cardiovascular:  Negative for chest pain and palpitations.   Gastrointestinal:  Negative for abdominal pain, blood in stool, constipation and nausea.   Genitourinary:  Negative for hematuria.   Skin:  Negative for rash.   Neurological:  Positive for headaches (intermittent). Negative for dizziness and weakness.           Objective:       Physical Examination:  Vitals:   Blood pressure 110/70, pulse 76, temperature 98.9 °F (37.2 °C), temperature source Temporal, resp. rate 18, height 5' 3" (1.6 m), weight 71.7 kg (158 lb 1.1 oz), SpO2 97%.    Physical Exam  Constitutional:       General: She is not in acute distress.     Appearance: She is not diaphoretic.   Eyes:      General: No scleral icterus.  Cardiovascular:      Rate and Rhythm: Normal rate and regular rhythm.      Pulses: Normal pulses.      Heart sounds: No murmur heard.  Pulmonary:      Effort: Pulmonary effort is normal. No respiratory distress.      Breath sounds: Normal breath sounds. No wheezing.   Musculoskeletal:      Right lower leg: No edema.      Left lower leg: No edema.   Skin:     General: Skin is warm.      Findings: No rash.   Neurological:      General: No focal deficit present.      Mental Status: She is alert and oriented to person, place, and time.   Psychiatric:         Behavior: Behavior normal.         Thought " "Content: Thought content normal.          Most Recent Data:  CBC:   Lab Results   Component Value Date    WBC 6.57 07/03/2025    HGB 13.5 07/03/2025    HCT 41.4 07/03/2025     07/03/2025    MCV 92 07/03/2025    RDW 15.3 (H) 07/03/2025     BMP:   Lab Results   Component Value Date     02/18/2025    K 4.0 02/18/2025     02/18/2025    CO2 21 (L) 02/18/2025    BUN 9 02/18/2025    GLU 88 02/18/2025    CALCIUM 9.4 02/18/2025     LFTs:   Lab Results   Component Value Date    PROT 7.5 02/18/2025    ALBUMIN 4.2 02/18/2025    BILITOT 0.3 02/18/2025    AST 34 02/18/2025    ALKPHOS 63 02/18/2025    ALT 13 02/18/2025     Coags: No results found for: "INR", "PROTIME", "PTT"  FLP: No results found for: "CHOL", "HDL", "LDLCALC", "TRIG", "CHOLHDL"  DM:   Lab Results   Component Value Date    CREATININE 0.7 02/18/2025     Thyroid:   Lab Results   Component Value Date    TSH 1.929 02/18/2025     Anemia:   Lab Results   Component Value Date    IRON 107 07/03/2025    TIBC 388 07/03/2025    FERRITIN 193.0 07/03/2025       Radiology and other Results:  No results found.      Assessment     1. Iron deficiency anemia due to chronic blood loss    2. Menorrhagia with regular cycle         Plan     #Iron deficiency anemia - due to her blood loss from menorrhagia which has now improved  -Received Venofer 200 mg 4/30/25, 5/9/25, 5/16/25, 5/23/25 anf 5/28/25  -Hgb of 13.5 g/dL,  ferritin 193 ng/mL on 7/3/25, improved  -Patient to RTC in 4 months with CBC, iron studies and ferritin a couple days prior  -If anemia still stable with next visit, will d/c and can come back PRN    #Murmur  -heard on initial assessment  -resolved since anemia resolved     #Menorrhagia with regular cycle  -Improved since starting on OCPs  -continue follow up with Dr. Krause, gyn    Thank you for the opportunity in participating in the care of this very nice patient. If you have any questions or concerns, please do not hesitate to contact me. "     BEBE Bhagat  Hematology and Medical Oncology  [facility]  [facility]    25 minutes of total time spent on the encounter, which includes face to face time and non-face to face time preparing to see the patient (eg, review of tests), Obtaining and/or reviewing separately obtained history, documenting clinical information in the electronic or other health record, independently interpreting results if documented above (not separately reported) and communicating results to the patient/family/caregiver, or Care coordination (not separately reported).     Route Chart for Scheduling    Med Onc Chart Routing      Follow up with physician    Follow up with TON . Patient will need CBC, iron studies and ferritin in 4 months at least one day prior to seeing TON   Infusion scheduling note    Injection scheduling note    Labs    Imaging    Pharmacy appointment    Other referrals                  Therapy Plan Information  OP IV IRON THERAPY for Iron deficiency anemia due to chronic blood loss, noted on 4/4/2025  Anaphylaxis/Hypersensitivity  EPINEPHrine (EPIPEN) 0.3 mg/0.3 mL pen injection 0.3 mg  0.3 mg, Intramuscular, PRN  hydrocortisone sodium succinate injection 100 mg  100 mg, Intravenous, PRN  Flushes  0.9% NaCl 250 mL flush bag  Intravenous, Every visit  sodium chloride 0.9% flush 10 mL  10 mL, Intravenous, Every visit  heparin, porcine (PF) 100 unit/mL injection flush 500 Units  500 Units, Intravenous, Every visit  alteplase injection 2 mg  2 mg, Intra-Catheter, Every visit      No therapy plan of the specified type found.    No therapy plan of the specified type found.             [1]   Current Outpatient Medications on File Prior to Visit   Medication Sig Dispense Refill    ibuprofen (ADVIL,MOTRIN) 600 MG tablet Take 1 tablet (600 mg total) by mouth every 6 (six) hours. 30 tablet 0    nitrofurantoin, macrocrystal-monohydrate, (MACROBID) 100 MG capsule Take 100 mg by  mouth 2 (two) times daily.      norethindrone-ethinyl estradiol (JUNEL FE 1/20) 1 mg-20 mcg (21)/75 mg (7) per tablet Take 1 tablet by mouth once daily. 90 tablet 3    oxyCODONE-acetaminophen (PERCOCET) 5-325 mg per tablet Take 1 tablet by mouth every 4 (four) hours as needed. 15 tablet 0    PNV #22-iron [id number] 28 mg iron-6 mg iron-1 mg Cmpk Take 1 tablet by mouth once daily.       valACYclovir (VALTREX) 500 MG tablet TAKE 1 TABLET(500 MG) BY MOUTH TWICE DAILY 60 tablet 1     No current facility-administered medications on file prior to visit.   [2]   Social History  Tobacco Use    Smoking status: Never    Smokeless tobacco: Never   Substance Use Topics    Alcohol use: No    Drug use: No